# Patient Record
Sex: FEMALE | Race: WHITE | NOT HISPANIC OR LATINO | Employment: FULL TIME | URBAN - METROPOLITAN AREA
[De-identification: names, ages, dates, MRNs, and addresses within clinical notes are randomized per-mention and may not be internally consistent; named-entity substitution may affect disease eponyms.]

---

## 2020-10-09 ENCOUNTER — TRANSFERRED RECORDS (OUTPATIENT)
Dept: MULTI SPECIALTY CLINIC | Facility: CLINIC | Age: 55
End: 2020-10-09

## 2023-03-02 ENCOUNTER — OFFICE VISIT (OUTPATIENT)
Dept: FAMILY MEDICINE | Facility: CLINIC | Age: 58
End: 2023-03-02
Payer: COMMERCIAL

## 2023-03-02 ENCOUNTER — TELEPHONE (OUTPATIENT)
Dept: FAMILY MEDICINE | Facility: CLINIC | Age: 58
End: 2023-03-02

## 2023-03-02 VITALS
DIASTOLIC BLOOD PRESSURE: 83 MMHG | RESPIRATION RATE: 16 BRPM | SYSTOLIC BLOOD PRESSURE: 133 MMHG | WEIGHT: 192.7 LBS | HEART RATE: 80 BPM | TEMPERATURE: 98.3 F | OXYGEN SATURATION: 98 %

## 2023-03-02 DIAGNOSIS — Z12.4 CERVICAL CANCER SCREENING: ICD-10-CM

## 2023-03-02 DIAGNOSIS — Z13.228 ENCOUNTER FOR SCREENING FOR OTHER METABOLIC DISORDERS: ICD-10-CM

## 2023-03-02 DIAGNOSIS — Z00.00 HEALTH CARE MAINTENANCE: Primary | ICD-10-CM

## 2023-03-02 DIAGNOSIS — B35.3 TINEA PEDIS OF LEFT FOOT: ICD-10-CM

## 2023-03-02 DIAGNOSIS — D68.51 FACTOR 5 LEIDEN MUTATION, HETEROZYGOUS (H): ICD-10-CM

## 2023-03-02 DIAGNOSIS — Z12.31 VISIT FOR SCREENING MAMMOGRAM: ICD-10-CM

## 2023-03-02 DIAGNOSIS — Z13.220 SCREENING FOR HYPERLIPIDEMIA: ICD-10-CM

## 2023-03-02 PROBLEM — I10 ESSENTIAL HYPERTENSION: Status: ACTIVE | Noted: 2018-07-27

## 2023-03-02 PROBLEM — K43.2 VENTRAL HERNIA, RECURRENT: Status: ACTIVE | Noted: 2018-03-09

## 2023-03-02 PROBLEM — R01.1 HEART MURMUR: Status: ACTIVE | Noted: 2018-08-15

## 2023-03-02 PROBLEM — E66.9 OBESITY (BMI 30-39.9): Status: ACTIVE | Noted: 2018-03-09

## 2023-03-02 PROBLEM — E55.9 VITAMIN D DEFICIENCY: Status: ACTIVE | Noted: 2018-08-17

## 2023-03-02 PROBLEM — K43.2 INCISIONAL HERNIA: Status: ACTIVE | Noted: 2017-06-16

## 2023-03-02 PROBLEM — R10.9 ABDOMINAL PAIN: Status: ACTIVE | Noted: 2022-05-24

## 2023-03-02 PROBLEM — E88.810 METABOLIC SYNDROME: Status: ACTIVE | Noted: 2018-07-27

## 2023-03-02 PROBLEM — K21.9 GERD (GASTROESOPHAGEAL REFLUX DISEASE): Status: ACTIVE | Noted: 2018-07-27

## 2023-03-02 PROBLEM — G47.33 OBSTRUCTIVE SLEEP APNEA: Status: ACTIVE | Noted: 2018-10-26

## 2023-03-02 PROCEDURE — 99213 OFFICE O/P EST LOW 20 MIN: CPT | Mod: 25 | Performed by: FAMILY MEDICINE

## 2023-03-02 PROCEDURE — 90750 HZV VACC RECOMBINANT IM: CPT | Performed by: FAMILY MEDICINE

## 2023-03-02 PROCEDURE — 90471 IMMUNIZATION ADMIN: CPT | Performed by: FAMILY MEDICINE

## 2023-03-02 PROCEDURE — 99386 PREV VISIT NEW AGE 40-64: CPT | Mod: 25 | Performed by: FAMILY MEDICINE

## 2023-03-02 PROCEDURE — G0145 SCR C/V CYTO,THINLAYER,RESCR: HCPCS | Performed by: FAMILY MEDICINE

## 2023-03-02 PROCEDURE — 87624 HPV HI-RISK TYP POOLED RSLT: CPT | Performed by: FAMILY MEDICINE

## 2023-03-02 RX ORDER — ASPIRIN 325 MG
TABLET ORAL
COMMUNITY

## 2023-03-02 RX ORDER — CLOTRIMAZOLE AND BETAMETHASONE DIPROPIONATE 10; .64 MG/G; MG/G
CREAM TOPICAL
Qty: 15 G | Refills: 1 | Status: SHIPPED | OUTPATIENT
Start: 2023-03-02 | End: 2024-03-28

## 2023-03-02 ASSESSMENT — ENCOUNTER SYMPTOMS
JOINT SWELLING: 0
WEAKNESS: 0
HEADACHES: 0
HEMATURIA: 0
DYSURIA: 0
PALPITATIONS: 0
SORE THROAT: 0
FEVER: 0
BREAST MASS: 0
MYALGIAS: 0
COUGH: 0
CHILLS: 0
EYE PAIN: 0
NAUSEA: 0
CONSTIPATION: 0
NERVOUS/ANXIOUS: 0
SHORTNESS OF BREATH: 0
ARTHRALGIAS: 0
DIZZINESS: 0
FREQUENCY: 0
HEMATOCHEZIA: 0
ABDOMINAL PAIN: 0
HEARTBURN: 0
DIARRHEA: 0
PARESTHESIAS: 0

## 2023-03-02 NOTE — TELEPHONE ENCOUNTER
Please abstract the following data from this visit with this patient into the appropriate field in Epic:      Other Tests found in the patient's chart through Chart Review/Care Everywhere:    Colonoscopy done by this group Anil on this date: 10/19/20    Note to [employer]straction: results were found via Chart Review/Care Everywhere.

## 2023-03-02 NOTE — PATIENT INSTRUCTIONS
"Heterozygous carriers of FVL generally do not require prophylactic anticoagulants or antiplatelet agents unless there is a clinical indication such as an acute medical illness or surgery for which routine thromboprophylaxis is indicated. However, unlike the general population, we are more likely to use anticoagulation for certain surgeries (see 'Surgery' below); when contraception is appropriate, we advise to consider non-estrogen-containing methods when possible (eg, when these methods are available) (see \"Contraception: Counseling for women with inherited thrombophilias\"); and we are more likely to anticoagulate during pregnancy and postpartum if other risk factors are present. (See 'Obstetric issues' below.)  A common question that arises is the prevention of VTE during airline travel or other situations with prolonged sitting. We generally suggest ambulating and performing leg exercises while seated. Compression stockings may be appropriate for individuals with leg edema. There are no high-quality data that aspirin or an anticoagulant reduces the risk of VTE; however, we do not object to the administration of low-dose aspirin as long as the individual is aware that it is not supported by high-quality evidence. (See \"Prevention of venous thromboembolism in adult travelers\".)  We also do not perform screening for other inherited thrombophilias in these asymptomatic individuals as a way to estimate their risk of VTE, because there are no data to support this practice.   "

## 2023-03-02 NOTE — PROGRESS NOTES
FEMALE ADULT PREVENTIVE EXAM    Problem List Items Addressed This Visit     Factor 5 Leiden mutation, heterozygous (H)     Refer to hematology to see if she needs more aggressive treatment to prevent DVT on her flight.         Relevant Orders    Adult Oncology/Hematology  Referral    Health care maintenance - Primary     Up to date with colonoscopy.         Relevant Medications    Multiple Vitamin (MULTIVITAMIN PO)    aspirin (ASA) 325 MG tablet    Other Relevant Orders    REVIEW OF HEALTH MAINTENANCE PROTOCOL ORDERS (Completed)    ZOSTER VACCINE RECOMBINANT ADJUVANTED (SHINGRIX) (Completed)    PRIMARY CARE FOLLOW-UP SCHEDULING   Other Visit Diagnoses     Cervical cancer screening        Relevant Orders    Pap Screen with HPV - recommended age 30 - 65 years    Screening for hyperlipidemia        Encounter for screening for other metabolic disorders        Relevant Orders    Lipid panel reflex to direct LDL Fasting    Comprehensive metabolic panel (BMP + Alb, Alk Phos, ALT, AST, Total. Bili, TP)    Visit for screening mammogram        Relevant Orders    MA Screen Bilateral w/Bob    Tinea pedis of left foot        Relevant Medications    aspirin (ASA) 325 MG tablet    clotrimazole-betamethasone (LOTRISONE) 1-0.05 % external cream            CHIEF COMPLAINT:  Female preventive exam.    SUBJECTIVE:  Andreia Bustamante is a 57 year old female who presents for her routine physical exam.    Patient would like to address the following concerns today:   1) She was dx with Factor 5 Leiden mutation, heterozygous.  She has had 2 superficial thromboses in the past.  She takes a daily 325 mg asa.  She was diagnosed by a hematologist.  No history of PE/ DVT.  She will be flying to [facility] in March for a mission trip with her family. [facility].  She did take Lovenox injections during her last plane trip years ago.    2) She has had a rash on her left foot for many years.  She  took some medicine in past for several months which cleared the rash.  Rash is very itchy.  No rash on right foot.  Wears boots/ tennis shoes.  Rash worse in warmer weather.        GYNE HISTORY  Menses: post menopausal  Last Pap: 2019 normal  Abnormal Pap: no      She  has no past medical history on file.    No results found for: WBC, HGB, HCT, MCV, PLT, NA, BUN, CR  No results found for: CHOL, HDL, TRIG, CHOLHDL  No results found for: TSH  BP Readings from Last 3 Encounters:   03/02/23 133/83       Surgeries:  No past surgical history on file.    Family History:  Her family history is not on file. Father with colon cancer.    Social History:  She  reports that she has never smoked. She has never been exposed to tobacco smoke. She has never used smokeless tobacco. .  Lives with daughter and has 3 grown sons.  Works as  at [employer].    Medications:    Current Outpatient Medications:      aspirin (ASA) 325 MG tablet, Take 1 tablet by mouth once daily with a meal., Disp: , Rfl:      clotrimazole-betamethasone (LOTRISONE) 1-0.05 % external cream, Apply to left foot twice daily until rash is resolved., Disp: 15 g, Rfl: 1     Multiple Vitamin (MULTIVITAMIN PO), Take 1 tab by oral route once daily with food., Disp: , Rfl:       Allergies:  No latex allergies.  Allergies   Allergen Reactions     Dust Mites Unknown     Pollen Extract      Other reaction(s): Runny Nose     Seasonal Allergies      Other reaction(s): Runny Nose            RISK BEHAVIOR & HEALTH HABITS  Answers for HPI/ROS submitted by the patient on 3/2/2023  Frequency of exercise:: 2-3 days/week  Getting at least 3 servings of Calcium per day:: Yes  Diet:: Regular (no restrictions)  Taking medications regularly:: Yes  Medication side effects:: None  Bi-annual eye exam:: Yes  Dental care twice a year:: Yes  Sleep apnea or symptoms of sleep apnea:: Sleep apnea  abdominal pain: No  Blood in stool: No  Blood in urine: No  chest pain:  No  chills: No  congestion: No  constipation: No  cough: No  diarrhea: No  dizziness: No  ear pain: No  eye pain: No  nervous/anxious: No  fever: No  frequency: No  genital sores: No  headaches: No  hearing loss: No  heartburn: No  arthralgias: No  joint swelling: No  peripheral edema: No  mood changes: No  myalgias: No  nausea: No  dysuria: No  palpitations: No  Skin sensation changes: No  sore throat: No  urgency: No  rash: No  shortness of breath: No  visual disturbance: No  weakness: No  pelvic pain: No  vaginal bleeding: No  vaginal discharge: No  tenderness: No  breast mass: No  breast discharge: No  Additional concerns today:: Yes  Duration of exercise:: Less than 15 minutes        REVIEW OF SYSTEMS:  Complete head to toe review of systems is otherwise negative except as above.    OBJECTIVE:  VITAL SIGNS:  /83 (BP Location: Left arm, Patient Position: Sitting, Cuff Size: Adult Large)   Pulse 80   Temp 98.3  F (36.8  C) (Oral)   Resp 16   Wt 87.4 kg (192 lb 11.2 oz)   LMP  (LMP Unknown)   SpO2 98%   GENERAL:  Patient alert, in no acute distress.  EYES: PERRLA. Extraoccular movements intact, pupils equal, reactive to light and accommodation.  Normal conjunctiva and lids.    ENT:  Hearing grossly normal.  Normal appearance to ears and nose.  Bilateral TM s, external canals, oropharynx normal. Normal lips, gums and teeth.    NECK:  Supple, without thyromegaly or mass.  RESP:  Clear to auscultation without crackles, wheezes or distress.  Normal respiratory effort.   CV:  Regular rate and rhythm without murmurs, rubs or gallops.  Normal pedal pulses.  No varicosities or edema.  ABDOMEN:  Soft, non-tender, without hepatosplenomegaly, masses, or hernias.   BREASTS:  Nontender, without masses, nipple discharge, erythema, or axillary adenopathy.    :    External genitalia:  Normal without lesions.  Urethra: Normal appearing  Vagina: Normal without discharge  Cervix: Pink.  Multiparous.  No CMT.  Uterus:   Nontender, mobile, without masses  Ovaries: Normal without masses  LYMPHATIC: Normal palpation of neck.  No lymphadenopathy.  No bruising.  NEURO:  CN II-XII intact, motor & sensory function all intact.  DTR and reflexes normal.  PSYCHIATRIC:  Alert & oriented with normal mood and affect.  Good judgment and insight.  SKIN:  Left foot - erythematous slightly peeling rash on toes / distal foot.  MUSCULOSKELETAL: Normal gait and station.  - Spine / Ribs / Pelvis: Normal inspection, ROM, stability and strength: Spine, Head, Neck, Upper and Lower Extremities.          Armida Khoury MD

## 2023-03-06 ENCOUNTER — ANCILLARY PROCEDURE (OUTPATIENT)
Dept: MAMMOGRAPHY | Facility: HOSPITAL | Age: 58
End: 2023-03-06
Attending: FAMILY MEDICINE
Payer: COMMERCIAL

## 2023-03-06 DIAGNOSIS — Z12.31 VISIT FOR SCREENING MAMMOGRAM: ICD-10-CM

## 2023-03-06 PROCEDURE — 77067 SCR MAMMO BI INCL CAD: CPT

## 2023-03-07 LAB
BKR LAB AP GYN ADEQUACY: NORMAL
BKR LAB AP GYN INTERPRETATION: NORMAL
BKR LAB AP HPV REFLEX: NORMAL
BKR LAB AP PREVIOUS ABNORMAL: NORMAL
PATH REPORT.COMMENTS IMP SPEC: NORMAL
PATH REPORT.COMMENTS IMP SPEC: NORMAL
PATH REPORT.RELEVANT HX SPEC: NORMAL

## 2023-03-09 LAB
HUMAN PAPILLOMA VIRUS 16 DNA: NEGATIVE
HUMAN PAPILLOMA VIRUS 18 DNA: NEGATIVE
HUMAN PAPILLOMA VIRUS FINAL DIAGNOSIS: NORMAL
HUMAN PAPILLOMA VIRUS OTHER HR: NEGATIVE

## 2023-03-10 ENCOUNTER — LAB (OUTPATIENT)
Dept: LAB | Facility: CLINIC | Age: 58
End: 2023-03-10
Payer: COMMERCIAL

## 2023-03-10 ENCOUNTER — VIRTUAL VISIT (OUTPATIENT)
Dept: HEMATOLOGY | Facility: CLINIC | Age: 58
End: 2023-03-10
Attending: FAMILY MEDICINE
Payer: COMMERCIAL

## 2023-03-10 DIAGNOSIS — Z11.4 SCREENING FOR HIV (HUMAN IMMUNODEFICIENCY VIRUS): ICD-10-CM

## 2023-03-10 DIAGNOSIS — Z11.59 NEED FOR HEPATITIS C SCREENING TEST: ICD-10-CM

## 2023-03-10 DIAGNOSIS — Z13.228 ENCOUNTER FOR SCREENING FOR OTHER METABOLIC DISORDERS: ICD-10-CM

## 2023-03-10 DIAGNOSIS — D68.51 FACTOR 5 LEIDEN MUTATION, HETEROZYGOUS (H): ICD-10-CM

## 2023-03-10 DIAGNOSIS — I80.03 THROMBOPHLEBITIS OF SUPERFICIAL VEINS OF BOTH LOWER EXTREMITIES: Primary | ICD-10-CM

## 2023-03-10 LAB
ALBUMIN SERPL BCG-MCNC: 4.2 G/DL (ref 3.5–5.2)
ALP SERPL-CCNC: 78 U/L (ref 35–104)
ALT SERPL W P-5'-P-CCNC: 27 U/L (ref 10–35)
ANION GAP SERPL CALCULATED.3IONS-SCNC: 12 MMOL/L (ref 7–15)
AST SERPL W P-5'-P-CCNC: 35 U/L (ref 10–35)
BILIRUB SERPL-MCNC: 0.4 MG/DL
BUN SERPL-MCNC: 17.8 MG/DL (ref 6–20)
CALCIUM SERPL-MCNC: 9.3 MG/DL (ref 8.6–10)
CHLORIDE SERPL-SCNC: 103 MMOL/L (ref 98–107)
CHOLEST SERPL-MCNC: 232 MG/DL
CREAT SERPL-MCNC: 0.71 MG/DL (ref 0.51–0.95)
DEPRECATED HCO3 PLAS-SCNC: 25 MMOL/L (ref 22–29)
GFR SERPL CREATININE-BSD FRML MDRD: >90 ML/MIN/1.73M2
GLUCOSE SERPL-MCNC: 89 MG/DL (ref 70–99)
HDLC SERPL-MCNC: 82 MG/DL
LDLC SERPL CALC-MCNC: 133 MG/DL
NONHDLC SERPL-MCNC: 150 MG/DL
POTASSIUM SERPL-SCNC: 4 MMOL/L (ref 3.4–5.3)
PROT SERPL-MCNC: 7 G/DL (ref 6.4–8.3)
SODIUM SERPL-SCNC: 140 MMOL/L (ref 136–145)
TRIGL SERPL-MCNC: 84 MG/DL

## 2023-03-10 PROCEDURE — 80053 COMPREHEN METABOLIC PANEL: CPT

## 2023-03-10 PROCEDURE — 36415 COLL VENOUS BLD VENIPUNCTURE: CPT

## 2023-03-10 PROCEDURE — 99204 OFFICE O/P NEW MOD 45 MIN: CPT | Mod: VID | Performed by: PHYSICIAN ASSISTANT

## 2023-03-10 PROCEDURE — 80061 LIPID PANEL: CPT

## 2023-03-10 PROCEDURE — 87389 HIV-1 AG W/HIV-1&-2 AB AG IA: CPT

## 2023-03-10 PROCEDURE — 86803 HEPATITIS C AB TEST: CPT

## 2023-03-10 NOTE — PROGRESS NOTES
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    Pertinent Clinical History:    1998--Found to have right lower extremity superficial thrombophlebitis involving 4cm of the right greater saphenous vein adjacent to the knee. This occurred when she was <1 week post-partum.      10/25/2006--Found to have right lower extremity superficial thrombophlebitis affecting a superficial varicosity near the popliteal region. Was pregnant during this time and had recently had extended car travel. Work-up surrounding this revealed heterozygous factor V leiden. She has been on 325mg ASA ever since.      2010--Found to have left lower extremity superificial thrombophlebitis with complete occlusion of a superficial venous varicosity. This was seemingly unprovoked.      2010--Found to have left lower extremity superficial thrombophlebitis in the left greater saphenous vein over the distal thigh. This was seemingly unprovoked.      03/10/2022--Underwent bilateral greater saphenous vein ablations.    Has never had a DVT or PE. Reports that she was placed on prophylactic Lovenox surrounding air travel years ago, but otherwise has no history of being on anticoagulation.     Interval History:   Takes 325mg ASA daily. No recurrent superficial thrombophlebitis since her bilateral greater saphenous vein ablations one year  ago.    Will be leaving for [facility] on 03/15/2023, staying through 03/28/2023. She will be traveling with a group for mission/volunteer work, as well as some tourism. Total estimated air time is ~17 hours.    ROS:  Denies epistaxis, oral/mucosal bleeding, excessive bruising/ecchymosis, hematuria, hematochezia, and melena. Denies LE pain/swelling. Denies chest pain. Denies shortness of breath.   Reports that she is otherwise generally well, except as stated above.    Family History:  No known family history of venous thromboembolism.     Exam via Televideo:    Constitutional: Appears well, no distress  Respiratory: Normal work of breathing.  Skin: no petechiae, no ecchymosis.  Neuro: AOx3    Labs:  Ref Range & Units 9 mo ago    Sodium 136 - 145 mmol/L 141    Potassium 3.5 - 5.1 mmol/L 3.7    Chloride 98 - 109 mmol/L 105    CO2 20 - 29 mmol/L 24    Anion Gap 7 - 16 mmol/L 12    Calcium 8.4 - 10.4 mg/dL 8.5    BUN 7 - 26 mg/dL 13    Creatinine 0.55 - 1.02 mg/dL 0.63    GFR, Estimated >60 mL/min/1.73m2 >60    Glucose 70 - 100 mg/dL 122 High     Comment: The given reference range is for the fasting state. Non-fasting reference range for glucose is 70 - 180 mg/dL.     Imaging:  Previous lower extremity ultrasound studies were reviewed, as summarized above.    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 She has no history of DVT/PE.    We discussed the difference between superficial and deep venous thrombosis, and the lower risk nature of SVT vs. DVT. We discussed that heterozygous Factor V Leiden exists in ~5% of the white population and that it is a relatively weak risk factor for VTE, with only ~10%  of those affected expected to develop a venous thromboembolism event in their lifetime, and often those that do develop a venous thromboembolism have additional contributing risk factors.     Seeing that Andreia has had multiple episodes of superficial thrombophlebitis in the past, with travel and pregnancy being historical provoking factors, it is certainly reasonable to utilize episodic prophylactic anticoagulation during high risk times for venous thrombosis (long distance travel, hospitalization, post-op, immobilization). Recommended Xarelto 10mg daily on travel days. Recommended avoidance of estrogen containing medications. No indication for chronic anticoagulation     Andreia was instructed to contact us in the future for specific anticoagulation recommendations surrounding high risk times (long distance travel, hospitalization, post-op, immobilization).    The patient understands and agrees with the above recommendations. The patient was given our center's contact information and was instructed to call if any further questions/concerns arise.    45 minutes spent on the date of the encounter doing chart review, history and exam, documentation and further activities per the note.    Joined the call at 3/10/2023, 1:32:04 pm.  Left the call at 3/10/2023, 1:50:25 pm.  You were on the call for 18 minutes 20 seconds .       Ivett Beth PA-C, [facility]  [facility]  [address], [address], [place] [address]  Main: [phone], Fax: [phone]

## 2023-03-10 NOTE — PROGRESS NOTES
Patient was contacted to complete the pre-visit call prior to their video visit with the provider.      Allergies and medications were reviewed.    I thanked them for their time to cover this information     Lois Roberts CMA

## 2023-03-12 LAB
HCV AB SERPL QL IA: NONREACTIVE
HIV 1+2 AB+HIV1 P24 AG SERPL QL IA: NONREACTIVE

## 2024-01-17 ENCOUNTER — ALLIED HEALTH/NURSE VISIT (OUTPATIENT)
Dept: FAMILY MEDICINE | Facility: CLINIC | Age: 59
End: 2024-01-17
Payer: COMMERCIAL

## 2024-01-17 DIAGNOSIS — Z23 ENCOUNTER FOR IMMUNIZATION: Primary | ICD-10-CM

## 2024-01-17 PROCEDURE — 99207 PR NO CHARGE NURSE ONLY: CPT

## 2024-01-17 PROCEDURE — 90472 IMMUNIZATION ADMIN EACH ADD: CPT

## 2024-01-17 PROCEDURE — 90715 TDAP VACCINE 7 YRS/> IM: CPT

## 2024-01-17 PROCEDURE — 90750 HZV VACC RECOMBINANT IM: CPT

## 2024-01-17 PROCEDURE — 90471 IMMUNIZATION ADMIN: CPT

## 2024-01-17 NOTE — PROGRESS NOTES
Prior to immunization administration, verified patients identity using patient s name and date of birth. Please see Immunization Activity for additional information.     Screening Questionnaire for Adult Immunization    Are you sick today?   No   Do you have allergies to medications, food, a vaccine component or latex?   No   Have you ever had a serious reaction after receiving a vaccination?   No   Do you have a long-term health problem with heart, lung, kidney, or metabolic disease (e.g., diabetes), asthma, a blood disorder, no spleen, complement component deficiency, a cochlear implant, or a spinal fluid leak?  Are you on long-term aspirin therapy?   No   Do you have cancer, leukemia, HIV/AIDS, or any other immune system problem?   No   Do you have a parent, brother, or sister with an immune system problem?   No   In the past 3 months, have you taken medications that affect  your immune system, such as prednisone, other steroids, or anticancer drugs; drugs for the treatment of rheumatoid arthritis, Crohn s disease, or psoriasis; or have you had radiation treatments?   No   Have you had a seizure, or a brain or other nervous system problem?   No   During the past year, have you received a transfusion of blood or blood    products, or been given immune (gamma) globulin or antiviral drug?   No   For women: Are you pregnant or is there a chance you could become       pregnant during the next month?   No   Have you received any vaccinations in the past 4 weeks?   No     Immunization questionnaire answers were all negative.    I have reviewed the following standing orders:   This patient is due and qualifies for a TDAP vaccine.    Click here for Tdap Standing Order    I have reviewed the vaccines inclusion and exclusion criteria; No concerns regarding eligibility.         This patient is due and qualifies for the Zoster vaccine.    Click here for Zoster Standing Order    I have reviewed the vaccines inclusion and  exclusion criteria; No concerns regarding eligibility.     Patient instructed to remain in clinic for 15 minutes afterwards, and to report any adverse reactions.     Screening performed by Zafar Head Jr., MA on 1/17/2024 at 2:55 PM.

## 2024-03-22 ENCOUNTER — ANCILLARY PROCEDURE (OUTPATIENT)
Dept: MAMMOGRAPHY | Facility: HOSPITAL | Age: 59
End: 2024-03-22
Payer: COMMERCIAL

## 2024-03-22 DIAGNOSIS — Z12.31 VISIT FOR SCREENING MAMMOGRAM: ICD-10-CM

## 2024-03-22 PROCEDURE — 77063 BREAST TOMOSYNTHESIS BI: CPT

## 2024-03-28 ENCOUNTER — OFFICE VISIT (OUTPATIENT)
Dept: FAMILY MEDICINE | Facility: CLINIC | Age: 59
End: 2024-03-28
Payer: COMMERCIAL

## 2024-03-28 VITALS
BODY MASS INDEX: 37.45 KG/M2 | TEMPERATURE: 97.5 F | RESPIRATION RATE: 18 BRPM | HEART RATE: 84 BPM | OXYGEN SATURATION: 98 % | HEIGHT: 66 IN | SYSTOLIC BLOOD PRESSURE: 161 MMHG | WEIGHT: 233 LBS | DIASTOLIC BLOOD PRESSURE: 91 MMHG

## 2024-03-28 DIAGNOSIS — I10 ESSENTIAL HYPERTENSION: ICD-10-CM

## 2024-03-28 DIAGNOSIS — G47.33 OBSTRUCTIVE SLEEP APNEA: ICD-10-CM

## 2024-03-28 DIAGNOSIS — H91.93 BILATERAL HEARING LOSS, UNSPECIFIED HEARING LOSS TYPE: ICD-10-CM

## 2024-03-28 DIAGNOSIS — R01.1 HEART MURMUR: ICD-10-CM

## 2024-03-28 DIAGNOSIS — D68.51 FACTOR 5 LEIDEN MUTATION, HETEROZYGOUS (H): ICD-10-CM

## 2024-03-28 DIAGNOSIS — E55.9 VITAMIN D DEFICIENCY: ICD-10-CM

## 2024-03-28 DIAGNOSIS — E66.812 CLASS 2 SEVERE OBESITY DUE TO EXCESS CALORIES WITH SERIOUS COMORBIDITY AND BODY MASS INDEX (BMI) OF 37.0 TO 37.9 IN ADULT (H): ICD-10-CM

## 2024-03-28 DIAGNOSIS — R73.03 PREDIABETES: ICD-10-CM

## 2024-03-28 DIAGNOSIS — R93.1 ABNORMAL ECHOCARDIOGRAM: ICD-10-CM

## 2024-03-28 DIAGNOSIS — Z00.00 ROUTINE GENERAL MEDICAL EXAMINATION AT A HEALTH CARE FACILITY: Primary | ICD-10-CM

## 2024-03-28 DIAGNOSIS — E66.01 CLASS 2 SEVERE OBESITY DUE TO EXCESS CALORIES WITH SERIOUS COMORBIDITY AND BODY MASS INDEX (BMI) OF 37.0 TO 37.9 IN ADULT (H): ICD-10-CM

## 2024-03-28 PROBLEM — K43.2 VENTRAL HERNIA, RECURRENT: Status: RESOLVED | Noted: 2018-03-09 | Resolved: 2024-03-28

## 2024-03-28 PROBLEM — E66.9 OBESITY (BMI 30-39.9): Status: RESOLVED | Noted: 2018-03-09 | Resolved: 2024-03-28

## 2024-03-28 PROBLEM — E88.810 METABOLIC SYNDROME: Status: RESOLVED | Noted: 2018-07-27 | Resolved: 2024-03-28

## 2024-03-28 PROBLEM — R10.9 ABDOMINAL PAIN: Status: RESOLVED | Noted: 2022-05-24 | Resolved: 2024-03-28

## 2024-03-28 PROBLEM — K43.2 INCISIONAL HERNIA: Status: RESOLVED | Noted: 2017-06-16 | Resolved: 2024-03-28

## 2024-03-28 PROBLEM — K21.9 GERD (GASTROESOPHAGEAL REFLUX DISEASE): Status: RESOLVED | Noted: 2018-07-27 | Resolved: 2024-03-28

## 2024-03-28 LAB
ALBUMIN SERPL BCG-MCNC: 4.2 G/DL (ref 3.5–5.2)
ALP SERPL-CCNC: 89 U/L (ref 40–150)
ALT SERPL W P-5'-P-CCNC: 26 U/L (ref 0–50)
ANION GAP SERPL CALCULATED.3IONS-SCNC: 10 MMOL/L (ref 7–15)
AST SERPL W P-5'-P-CCNC: 31 U/L (ref 0–45)
ATRIAL RATE - MUSE: 77 BPM
BILIRUB SERPL-MCNC: 0.7 MG/DL
BUN SERPL-MCNC: 15.3 MG/DL (ref 6–20)
CALCIUM SERPL-MCNC: 9.2 MG/DL (ref 8.6–10)
CHLORIDE SERPL-SCNC: 105 MMOL/L (ref 98–107)
CHOLEST SERPL-MCNC: 215 MG/DL
CREAT SERPL-MCNC: 0.75 MG/DL (ref 0.51–0.95)
DEPRECATED HCO3 PLAS-SCNC: 26 MMOL/L (ref 22–29)
DIASTOLIC BLOOD PRESSURE - MUSE: NORMAL MMHG
EGFRCR SERPLBLD CKD-EPI 2021: >90 ML/MIN/1.73M2
FASTING STATUS PATIENT QL REPORTED: YES
GLUCOSE SERPL-MCNC: 97 MG/DL (ref 70–99)
HBA1C MFR BLD: 5.5 % (ref 0–5.6)
HDLC SERPL-MCNC: 69 MG/DL
INTERPRETATION ECG - MUSE: NORMAL
LDLC SERPL CALC-MCNC: 123 MG/DL
NONHDLC SERPL-MCNC: 146 MG/DL
P AXIS - MUSE: 36 DEGREES
POTASSIUM SERPL-SCNC: 3.9 MMOL/L (ref 3.4–5.3)
PR INTERVAL - MUSE: 138 MS
PROT SERPL-MCNC: 7.1 G/DL (ref 6.4–8.3)
QRS DURATION - MUSE: 78 MS
QT - MUSE: 398 MS
QTC - MUSE: 450 MS
R AXIS - MUSE: 40 DEGREES
SODIUM SERPL-SCNC: 141 MMOL/L (ref 135–145)
SYSTOLIC BLOOD PRESSURE - MUSE: NORMAL MMHG
T AXIS - MUSE: 26 DEGREES
TRIGL SERPL-MCNC: 113 MG/DL
TSH SERPL DL<=0.005 MIU/L-ACNC: 2.57 UIU/ML (ref 0.3–4.2)
VENTRICULAR RATE- MUSE: 77 BPM
VIT D+METAB SERPL-MCNC: 27 NG/ML (ref 20–50)

## 2024-03-28 PROCEDURE — 80053 COMPREHEN METABOLIC PANEL: CPT | Performed by: FAMILY MEDICINE

## 2024-03-28 PROCEDURE — 84443 ASSAY THYROID STIM HORMONE: CPT | Performed by: FAMILY MEDICINE

## 2024-03-28 PROCEDURE — 82306 VITAMIN D 25 HYDROXY: CPT | Performed by: FAMILY MEDICINE

## 2024-03-28 PROCEDURE — 93005 ELECTROCARDIOGRAM TRACING: CPT | Performed by: FAMILY MEDICINE

## 2024-03-28 PROCEDURE — 36415 COLL VENOUS BLD VENIPUNCTURE: CPT | Performed by: FAMILY MEDICINE

## 2024-03-28 PROCEDURE — 93010 ELECTROCARDIOGRAM REPORT: CPT | Performed by: INTERNAL MEDICINE

## 2024-03-28 PROCEDURE — 99396 PREV VISIT EST AGE 40-64: CPT | Mod: 25 | Performed by: FAMILY MEDICINE

## 2024-03-28 PROCEDURE — 83036 HEMOGLOBIN GLYCOSYLATED A1C: CPT | Performed by: FAMILY MEDICINE

## 2024-03-28 PROCEDURE — 99214 OFFICE O/P EST MOD 30 MIN: CPT | Mod: 25 | Performed by: FAMILY MEDICINE

## 2024-03-28 PROCEDURE — 80061 LIPID PANEL: CPT | Performed by: FAMILY MEDICINE

## 2024-03-28 RX ORDER — LISINOPRIL 10 MG/1
10 TABLET ORAL DAILY
Qty: 30 TABLET | Refills: 1 | Status: SHIPPED | OUTPATIENT
Start: 2024-03-28 | End: 2024-04-30

## 2024-03-28 RX ORDER — LORATADINE 10 MG/1
10 TABLET ORAL DAILY
COMMUNITY
End: 2024-06-20

## 2024-03-28 SDOH — HEALTH STABILITY: PHYSICAL HEALTH: ON AVERAGE, HOW MANY MINUTES DO YOU ENGAGE IN EXERCISE AT THIS LEVEL?: 20 MIN

## 2024-03-28 SDOH — HEALTH STABILITY: PHYSICAL HEALTH: ON AVERAGE, HOW MANY DAYS PER WEEK DO YOU ENGAGE IN MODERATE TO STRENUOUS EXERCISE (LIKE A BRISK WALK)?: 1 DAY

## 2024-03-28 ASSESSMENT — SOCIAL DETERMINANTS OF HEALTH (SDOH): HOW OFTEN DO YOU GET TOGETHER WITH FRIENDS OR RELATIVES?: MORE THAN THREE TIMES A WEEK

## 2024-03-28 NOTE — ASSESSMENT & PLAN NOTE
"Multiple BP readings of 160s/90s recorded in the clinic. Reports frequently hears \"BP is a little elevated\" when it is checked. Offered further monitoring vs. Initiating treatment. Recommend starting Lisinopril 10mg today and evaluate in 1 month.   "

## 2024-03-28 NOTE — ASSESSMENT & PLAN NOTE
Has history of using hearing aids, one recently stopped working and she needs a new pair. Recommended following up with insurance regarding coverage options.

## 2024-03-28 NOTE — ASSESSMENT & PLAN NOTE
Audible murmur to auscultation. Previous ECHO in 2018 with EF 57% and severe LV wall thickening. Followed up with Cardiologist and had cardiac MRI that was normal. EKG today without sign of LVH.

## 2024-03-28 NOTE — PROGRESS NOTES
"Preventive Care Visit  [facility]  DENNYS MARTINEZ MD, Family Medicine  Mar 28, 2024      Assessment & Plan   Problem List Items Addressed This Visit       Essential hypertension     Multiple BP readings of 160s/90s recorded in the clinic. Reports frequently hears \"BP is a little elevated\" when it is checked. Offered further monitoring vs. Initiating treatment. Recommend starting Lisinopril 10mg today and evaluate in 1 month.          Relevant Medications    lisinopril (ZESTRIL) 10 MG tablet    Other Relevant Orders    Lipid panel reflex to direct LDL Fasting    Comprehensive metabolic panel (BMP + Alb, Alk Phos, ALT, AST, Total. Bili, TP)    TSH with free T4 reflex    Factor 5 Leiden mutation, heterozygous (H24)     Diagnosed after a DVT during pregnancy and superficial thrombophlebitis after that. Currently taking a daily aspirin. Takes rivoraxaban intermittently when travelling.         Hearing loss     Has history of using hearing aids, one recently stopped working and she needs a new pair. Recommended following up with insurance regarding coverage options.          Heart murmur     Audible murmur to auscultation. Previous ECHO in 2018 with EF 57% and severe LV wall thickening. Followed up with Cardiologist and had cardiac MRI that was normal. EKG today without sign of LVH.          Obstructive sleep apnea     Diagnosed with mild MELVIN and does not use CPAP.          Vitamin D deficiency     Previously diagnosed with Vitamin D deficiency. Currently takes a daily multivitamin with vitamin d but unsure of current dose. Recommend checking vitamin d level today to determine further treatment.          Class 2 severe obesity due to excess calories with serious comorbidity in adult (H)     Has a history of weight loss attempts with substantial weight loss in 2021, meeting personal weight loss goal and seeing improvement in hypertension with weight loss. Has since put back on weight but " "remains 20lbs below where she was. Briefly discussed complexity of weight loss management and the combination of factors including diet, exercise and medication as tools to maintain weight loss. Recommend a more comprehensive weight loss management appointment to discuss an individualized weight loss plan.          Relevant Orders    Vitamin D Deficiency    Prediabetes     Reports a history of prediabetes. Will check A1c today to further guide treatment.         Relevant Orders    Hemoglobin A1c (Completed)     Other Visit Diagnoses       Routine general medical examination at a health care facility    -  Primary    Abnormal echocardiogram        Relevant Orders    EKG 12-lead, tracing only (Completed)                BMI  Estimated body mass index is 37.89 kg/m  as calculated from the following:    Height as of this encounter: 1.67 m (5' 5.75\").    Weight as of this encounter: 105.7 kg (233 lb).       Counseling  Appropriate preventive services were discussed with this patient, including applicable screening as appropriate for fall prevention, nutrition, physical activity, Tobacco-use cessation, weight loss and cognition.  Checklist reviewing preventive services available has been given to the patient.  Reviewed patient's diet, addressing concerns and/or questions.   She is at risk for lack of exercise and has been provided with information to increase physical activity for the benefit of her well-being.   She is at risk for psychosocial distress and has been provided with information to reduce risk.         Ilene Boswell is a 58 year old who presents today to establish care as well as for an annual physical exam.  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  She has 4 children 1 of which still lives at home.  She feels well overall with her only question or concern regarding her left foot.  The patient gets an itchy rash on that foot on a regular basis and also has a history of onychomycosis. "  She does express frustration regarding her weight as well and would like to have a conversation about that today.  Physical        3/28/2024     7:43 AM   Additional Questions   Roomed by AS   Accompanied by SELF         3/28/2024     7:43 AM   Patient Reported Additional Medications   Patient reports taking the following new medications NO        Health Care Directive  Patient does not have a Health Care Directive or Living Will: Patient states has Advance Directive and will bring in a copy to clinic.          3/28/2024   General Health   How would you rate your overall physical health? Good   Feel stress (tense, anxious, or unable to sleep) Only a little   (!) STRESS CONCERN      3/28/2024   Nutrition   Three or more servings of calcium each day? (!) NO   Diet: Regular (no restrictions)   How many servings of fruit and vegetables per day? (!) 0-1   How many sweetened beverages each day? 0-1         3/28/2024   Exercise   Days per week of moderate/strenous exercise 1 day   Average minutes spent exercising at this level 20 min   (!) EXERCISE CONCERN      3/28/2024   Social Factors   Frequency of gathering with friends or relatives More than three times a week   Worry food won't last until get money to buy more No   Food not last or not have enough money for food? No   Do you have housing?  Yes   Are you worried about losing your housing? No   Lack of transportation? No   Unable to get utilities (heat,electricity)? No          No data to display                   3/28/2024   Dental   Dentist two times every year? Yes         3/28/2024   TB Screening   Were you born outside of the [place]? No         Today's PHQ-2 Score:       3/28/2024     7:51 AM   PHQ-2 ( 1999 [employer])   Q1: Little interest or pleasure in doing things 0   Q2: Feeling down, depressed or hopeless 0   PHQ-2 Score 0           3/28/2024   Substance Use   Alcohol more than 3/day or more than 7/wk No   Do you use any other substances recreationally? No  "    Social History     Tobacco Use    Smoking status: Never     Passive exposure: Never    Smokeless tobacco: Never   Vaping Use    Vaping Use: Never used             3/28/2024   Breast Cancer Screening   Family history of breast, colon, or ovarian cancer? Yes         3/28/2024   LAST FHS-7 RESULTS   1st degree relative breast or ovarian cancer No   Any relative bilateral breast cancer No   Any male have breast cancer No   Any ONE woman have BOTH breast AND ovarian cancer No   Any woman with breast cancer before 50yrs No   2 or more relatives with breast AND/OR ovarian cancer No   2 or more relatives with breast AND/OR bowel cancer No        Mammogram Screening - Mammogram every 1-2 years updated in Health Maintenance based on mutual decision making        3/28/2024   STI Screening   New sexual partner(s) since last STI/HIV test? No     History of abnormal Pap smear: NO - age 30-65 PAP every 5 years with negative HPV co-testing recommended        Latest Ref Rng & Units 3/2/2023     3:08 PM   PAP / HPV   PAP  Negative for Intraepithelial Lesion or Malignancy (NILM)    HPV 16 DNA Negative Negative    HPV 18 DNA Negative Negative    Other HR HPV Negative Negative      ASCVD Risk   The 10-year ASCVD risk score (Luz Elena TELLO, et al., 2019) is: 3.8%    Values used to calculate the score:      Age: 58 years      Sex: Female      Is Non- : No      Diabetic: No      Tobacco smoker: No      Systolic Blood Pressure: 161 mmHg      Is BP treated: Yes      HDL Cholesterol: 82 mg/dL      Total Cholesterol: 232 mg/dL           Reviewed and updated as needed this visit by Provider      Problems                    Objective    Exam  BP (!) 161/91 (BP Location: Left arm, Patient Position: Left side, Cuff Size: Adult Large)   Pulse 84   Temp 97.5  F (36.4  C) (Oral)   Resp 18   Ht 1.67 m (5' 5.75\")   Wt 105.7 kg (233 lb)   LMP  (LMP Unknown)   SpO2 98%   BMI 37.89 kg/m     Estimated body mass " "index is 37.89 kg/m  as calculated from the following:    Height as of this encounter: 1.67 m (5' 5.75\").    Weight as of this encounter: 105.7 kg (233 lb).    Physical Exam  GENERAL: alert and no distress  EYES: Eyes grossly normal to inspection, PERRL and conjunctivae and sclerae normal  HENT: ear canals and TM's normal, nose and mouth without ulcers or lesions  NECK: no adenopathy, no asymmetry, masses, or scars  RESP: lungs clear to auscultation - no rales, rhonchi or wheezes  BREAST: normal without masses, tenderness or nipple discharge and no palpable axillary masses or adenopathy  CV: regular rate and rhythm, normal S1 S2, no S3 or S4, no murmur, click or rub, no peripheral edema  ABDOMEN: soft, nontender, no hepatosplenomegaly, no masses and bowel sounds normal  MS: no gross musculoskeletal defects noted, no edema  SKIN: no suspicious lesions or rashes  NEURO: Normal strength and tone, mentation intact and speech normal  PSYCH: mentation appears normal, affect normal/bright  LEFT FOOT:         Signed Electronically by: DENNYS MARTINEZ MD    "

## 2024-03-28 NOTE — ASSESSMENT & PLAN NOTE
Previously diagnosed with Vitamin D deficiency. Currently takes a daily multivitamin with vitamin d but unsure of current dose. Recommend checking vitamin d level today to determine further treatment.

## 2024-03-28 NOTE — ASSESSMENT & PLAN NOTE
Has a history of weight loss attempts with substantial weight loss in 2021, meeting personal weight loss goal and seeing improvement in hypertension with weight loss. Has since put back on weight but remains 20lbs below where she was. Briefly discussed complexity of weight loss management and the combination of factors including diet, exercise and medication as tools to maintain weight loss. Recommend a more comprehensive weight loss management appointment to discuss an individualized weight loss plan.

## 2024-03-28 NOTE — PATIENT INSTRUCTIONS
Preventive Care Advice   This is general advice given by our system to help you stay healthy. However, your care team may have specific advice just for you. Please talk to your care team about your preventive care needs.  Nutrition  Eat 5 or more servings of fruits and vegetables each day.  Try wheat bread, brown rice and whole grain pasta (instead of white bread, rice, and pasta).  Get enough calcium and vitamin D. Check the label on foods and aim for 100% of the RDA (recommended daily allowance).  Lifestyle  Exercise at least 150 minutes each week   (30 minutes a day, 5 days a week).  Do muscle strengthening activities 2 days a week. These help control your weight and prevent disease.  No smoking.  Wear sunscreen to prevent skin cancer.  Have a dental exam and cleaning every 6 months.  Yearly exams  See your health care team every year to talk about:  Any changes in your health.  Any medicines your care team has prescribed.  Preventive care, family planning, and ways to prevent chronic diseases.  Shots (vaccines)   HPV shots (up to age 26), if you've never had them before.  Hepatitis B shots (up to age 59), if you've never had them before.  COVID-19 shot: Get this shot when it's due.  Flu shot: Get a flu shot every year.  Tetanus shot: Get a tetanus shot every 10 years.  Pneumococcal, hepatitis A, and RSV shots: Ask your care team if you need these based on your risk.  Shingles shot (for age 50 and up).  General health tests  Diabetes screening:  Starting at age 35, Get screened for diabetes at least every 3 years.  If you are younger than age 35, ask your care team if you should be screened for diabetes.  Cholesterol test: At age 39, start having a cholesterol test every 5 years, or more often if advised.  Bone density scan (DEXA): At age 50, ask your care team if you should have this scan for osteoporosis (brittle bones).  Hepatitis C: Get tested at least once in your life.  STIs (sexually transmitted  infections)  Before age 24: Ask your care team if you should be screened for STIs.  After age 24: Get screened for STIs if you're at risk. You are at risk for STIs (including HIV) if:  You are sexually active with more than one person.  You don't use condoms every time.  You or a partner was diagnosed with a sexually transmitted infection.  If you are at risk for HIV, ask about PrEP medicine to prevent HIV.  Get tested for HIV at least once in your life, whether you are at risk for HIV or not.  Cancer screening tests  Cervical cancer screening: If you have a cervix, begin getting regular cervical cancer screening tests at age 21. Most people who have regular screenings with normal results can stop after age 65. Talk about this with your provider.  Breast cancer scan (mammogram): If you've ever had breasts, begin having regular mammograms starting at age 40. This is a scan to check for breast cancer.  Colon cancer screening: It is important to start screening for colon cancer at age 45.  Have a colonoscopy test every 10 years (or more often if you're at risk) Or, ask your provider about stool tests like a FIT test every year or Cologuard test every 3 years.  To learn more about your testing options, visit: https://www.[email or web address]/065749.pdf.  For help making a decision, visit: https://bit.ly/hx45591.  Prostate cancer screening test: If you have a prostate and are age 55 to 69, ask your provider if you would benefit from a yearly prostate cancer screening test.  Lung cancer screening: If you are a current or former smoker age 50 to 80, ask your care team if ongoing lung cancer screenings are right for you.  For informational purposes only. Not to replace the advice of your health care provider. Copyright   2023 [employer]. All rights reserved. Clinically reviewed by the [facility] Transitions Program. [employer] 380883 - REV 01/24.

## 2024-03-28 NOTE — ASSESSMENT & PLAN NOTE
Diagnosed after a DVT during pregnancy and superficial thrombophlebitis after that. Currently taking a daily aspirin. Takes rivoraxaban intermittently when travelling.

## 2024-04-04 ENCOUNTER — OFFICE VISIT (OUTPATIENT)
Dept: FAMILY MEDICINE | Facility: CLINIC | Age: 59
End: 2024-04-04
Payer: COMMERCIAL

## 2024-04-04 ENCOUNTER — E-VISIT (OUTPATIENT)
Dept: FAMILY MEDICINE | Facility: CLINIC | Age: 59
End: 2024-04-04
Payer: COMMERCIAL

## 2024-04-04 VITALS
SYSTOLIC BLOOD PRESSURE: 152 MMHG | TEMPERATURE: 98 F | OXYGEN SATURATION: 98 % | WEIGHT: 233.5 LBS | RESPIRATION RATE: 16 BRPM | BODY MASS INDEX: 37.52 KG/M2 | DIASTOLIC BLOOD PRESSURE: 84 MMHG | HEART RATE: 88 BPM | HEIGHT: 66 IN

## 2024-04-04 DIAGNOSIS — R30.0 DYSURIA: Primary | ICD-10-CM

## 2024-04-04 DIAGNOSIS — N30.01 ACUTE CYSTITIS WITH HEMATURIA: Primary | ICD-10-CM

## 2024-04-04 LAB
ALBUMIN UR-MCNC: NEGATIVE MG/DL
APPEARANCE UR: ABNORMAL
BACTERIA #/AREA URNS HPF: ABNORMAL /HPF
BILIRUB UR QL STRIP: NEGATIVE
COLOR UR AUTO: YELLOW
GLUCOSE UR STRIP-MCNC: NEGATIVE MG/DL
HGB UR QL STRIP: ABNORMAL
KETONES UR STRIP-MCNC: NEGATIVE MG/DL
LEUKOCYTE ESTERASE UR QL STRIP: ABNORMAL
NITRATE UR QL: NEGATIVE
PH UR STRIP: 7 [PH] (ref 5–8)
RBC #/AREA URNS AUTO: ABNORMAL /HPF
SP GR UR STRIP: 1.01 (ref 1–1.03)
SQUAMOUS #/AREA URNS AUTO: ABNORMAL /LPF
UROBILINOGEN UR STRIP-ACNC: 0.2 E.U./DL
WBC #/AREA URNS AUTO: ABNORMAL /HPF

## 2024-04-04 PROCEDURE — 99207 PR NO CHARGE LOS: CPT | Performed by: FAMILY MEDICINE

## 2024-04-04 PROCEDURE — 87086 URINE CULTURE/COLONY COUNT: CPT | Performed by: FAMILY MEDICINE

## 2024-04-04 PROCEDURE — 81001 URINALYSIS AUTO W/SCOPE: CPT | Performed by: FAMILY MEDICINE

## 2024-04-04 PROCEDURE — 99214 OFFICE O/P EST MOD 30 MIN: CPT | Performed by: FAMILY MEDICINE

## 2024-04-04 RX ORDER — SULFAMETHOXAZOLE/TRIMETHOPRIM 800-160 MG
1 TABLET ORAL 2 TIMES DAILY
Qty: 10 TABLET | Refills: 0 | Status: SHIPPED | OUTPATIENT
Start: 2024-04-04 | End: 2024-04-09

## 2024-04-04 NOTE — PROGRESS NOTES
"  Assessment & Plan   Problem List Items Addressed This Visit    None  Visit Diagnoses       Acute cystitis with hematuria    -  Primary    UA collected today with presence of WBCs and RBCs. Bactrim BIDx5 days. Return to clinic for repeat UA/UC in 1.5 weeks.    Relevant Medications    sulfamethoxazole-trimethoprim (BACTRIM DS) 800-160 MG tablet    Other Relevant Orders    UA Macroscopic with reflex to Microscopic and Culture - Clinic Collect (Completed)    UA Microscopic with Reflex to Culture (Completed)    Urine Culture    UA Macroscopic with reflex to Microscopic and Culture - Lab Collect                BMI  Estimated body mass index is 37.98 kg/m  as calculated from the following:    Height as of this encounter: 1.67 m (5' 5.75\").    Weight as of this encounter: 105.9 kg (233 lb 8 oz).         Ilene Boswell is a 58 year old presents to clinic with concerns for possible UTI. Woke up this morning with vaginal itching and burning. When she went to the bathroom she noticed blood in the urine and on the toilet paper with wiping. She is unsure if this was coming from the vagina or urethra. She also had burning with urination, frequency and urgency. As the day went on, these symptoms have seemed to improve with oral hydration but she is still feeling a burning sensation with voiding. Her last period was approximately 1.5-2 years ago. She is sexually active and has had no new partners.   UTI (Frequency urination with blood in urine, itchiness and burning since today )        4/4/2024     3:46 PM   Additional Questions   Roomed by chase     History of Present Illness       Reason for visit:  Itching an burning in vaginal area  blood in urine  Symptom onset:  Today    She eats 0-1 servings of fruits and vegetables daily.She consumes 0 sweetened beverage(s) daily.She exercises with enough effort to increase her heart rate 9 or less minutes per day.  She exercises with enough effort to increase her heart rate 3 or less " "days per week.   She is taking medications regularly.           Objective    BP (!) 152/84 (BP Location: Left arm, Patient Position: Sitting, Cuff Size: Adult Large)   Pulse 88   Temp 98  F (36.7  C) (Oral)   Resp 16   Ht 1.67 m (5' 5.75\")   Wt 105.9 kg (233 lb 8 oz)   LMP  (LMP Unknown)   SpO2 98%   BMI 37.98 kg/m    Body mass index is 37.98 kg/m .  Physical Exam   GENERAL: alert and no distress  ABDOMEN: soft, nontender, no hepatosplenomegaly, no masses and bowel sounds normal   (female): normal female external genitalia, normal urethral meatus, normal vaginal mucosa, normal cervix without presence of blood or discharge. No CVA tenderness to palpation.     Results for orders placed or performed in visit on 04/04/24   UA Macroscopic with reflex to Microscopic and Culture - Clinic Collect     Status: Abnormal    Specimen: Urine, Midstream   Result Value Ref Range    Color Urine Yellow Colorless, Straw, Light Yellow, Yellow    Appearance Urine Cloudy (A) Clear    Glucose Urine Negative Negative mg/dL    Bilirubin Urine Negative Negative    Ketones Urine Negative Negative mg/dL    Specific Gravity Urine 1.015 1.005 - 1.030    Blood Urine Large (A) Negative    pH Urine 7.0 5.0 - 8.0    Protein Albumin Urine Negative Negative mg/dL    Urobilinogen Urine 0.2 0.2, 1.0 E.U./dL    Nitrite Urine Negative Negative    Leukocyte Esterase Urine Small (A) Negative   UA Microscopic with Reflex to Culture     Status: Abnormal   Result Value Ref Range    Bacteria Urine Few (A) None Seen /HPF    RBC Urine  (A) 0-2 /HPF /HPF    WBC Urine 10-25 (A) 0-5 /HPF /HPF    Squamous Epithelials Urine Few (A) None Seen /LPF           Signed Electronically by: DENNYS MARTINEZ MD    "

## 2024-04-06 LAB — BACTERIA UR CULT: NORMAL

## 2024-04-07 DIAGNOSIS — R31.0 GROSS HEMATURIA: Primary | ICD-10-CM

## 2024-04-16 ENCOUNTER — LAB (OUTPATIENT)
Dept: LAB | Facility: CLINIC | Age: 59
End: 2024-04-16
Payer: COMMERCIAL

## 2024-04-16 ENCOUNTER — HOSPITAL ENCOUNTER (OUTPATIENT)
Dept: CT IMAGING | Facility: HOSPITAL | Age: 59
Discharge: HOME | End: 2024-04-16
Attending: FAMILY MEDICINE | Admitting: FAMILY MEDICINE
Payer: COMMERCIAL

## 2024-04-16 DIAGNOSIS — N30.01 ACUTE CYSTITIS WITH HEMATURIA: ICD-10-CM

## 2024-04-16 DIAGNOSIS — R31.0 GROSS HEMATURIA: ICD-10-CM

## 2024-04-16 LAB
ALBUMIN UR-MCNC: NEGATIVE MG/DL
APPEARANCE UR: CLEAR
BILIRUB UR QL STRIP: NEGATIVE
COLOR UR AUTO: YELLOW
GLUCOSE UR STRIP-MCNC: NEGATIVE MG/DL
HGB UR QL STRIP: NEGATIVE
KETONES UR STRIP-MCNC: NEGATIVE MG/DL
LEUKOCYTE ESTERASE UR QL STRIP: NEGATIVE
NITRATE UR QL: NEGATIVE
PH UR STRIP: 6.5 [PH] (ref 5–8)
SP GR UR STRIP: 1.01 (ref 1–1.03)
UROBILINOGEN UR STRIP-ACNC: 0.2 E.U./DL

## 2024-04-16 PROCEDURE — 81003 URINALYSIS AUTO W/O SCOPE: CPT

## 2024-04-16 PROCEDURE — 250N000011 HC RX IP 250 OP 636: Performed by: FAMILY MEDICINE

## 2024-04-16 PROCEDURE — 74177 CT ABD & PELVIS W/CONTRAST: CPT

## 2024-04-16 PROCEDURE — 250N000009 HC RX 250: Performed by: FAMILY MEDICINE

## 2024-04-16 RX ORDER — IOPAMIDOL 755 MG/ML
90 INJECTION, SOLUTION INTRAVASCULAR ONCE
Status: COMPLETED | OUTPATIENT
Start: 2024-04-16 | End: 2024-04-16

## 2024-04-16 RX ADMIN — SODIUM CHLORIDE 90 ML: 9 INJECTION, SOLUTION INTRAVENOUS at 07:26

## 2024-04-16 RX ADMIN — IOPAMIDOL 90 ML: 755 INJECTION, SOLUTION INTRAVENOUS at 07:24

## 2024-04-17 ENCOUNTER — TELEPHONE (OUTPATIENT)
Dept: FAMILY MEDICINE | Facility: CLINIC | Age: 59
End: 2024-04-17
Payer: COMMERCIAL

## 2024-04-17 NOTE — TELEPHONE ENCOUNTER
----- Message from Karlie Macias MD sent at 4/17/2024  7:05 AM CDT -----  Please call patient and let her know that her CT scan saw a single, small kidney stone in the left kidney.  It is not obstructing and is quite tiny.  Making sure she is drinking plenty of water would be a good idea.  She has a few gallstones which we do not normally worry about unless they are symptomatic with recurrent episodes of right upper quadrant abdominal pain after eating.    With the urinalysis coming back showing no microscopic blood anymore, I would recommend at this time that she continue to monitor and if she sees any more blood that we proceed with a urology consultation.  Otherwise, I think we can simply monitor for now.    Please let me know if she has any questions.

## 2024-04-30 ENCOUNTER — ALLIED HEALTH/NURSE VISIT (OUTPATIENT)
Dept: FAMILY MEDICINE | Facility: CLINIC | Age: 59
End: 2024-04-30
Payer: COMMERCIAL

## 2024-04-30 VITALS — SYSTOLIC BLOOD PRESSURE: 138 MMHG | HEART RATE: 76 BPM | DIASTOLIC BLOOD PRESSURE: 81 MMHG | OXYGEN SATURATION: 96 %

## 2024-04-30 DIAGNOSIS — I10 ESSENTIAL HYPERTENSION: Primary | ICD-10-CM

## 2024-04-30 PROCEDURE — 99207 PR NO CHARGE NURSE ONLY: CPT

## 2024-04-30 RX ORDER — LISINOPRIL 10 MG/1
10 TABLET ORAL DAILY
Qty: 90 TABLET | Refills: 1 | Status: SHIPPED | OUTPATIENT
Start: 2024-04-30 | End: 2024-05-31

## 2024-04-30 NOTE — PROGRESS NOTES
Please let patient know that I just sent in a 90-day supply of lisinopril 10 mg daily.  Her blood pressure is looking much better although I may want to consider further adjustment in 6 months when I see her back for another blood pressure check as ideally we would like her systolic number between 110 and 129.

## 2024-04-30 NOTE — PROGRESS NOTES
"3/28 OV note-  Essential hypertension        Multiple BP readings of 160s/90s recorded in the clinic. Reports frequently hears \"BP is a little elevated\" when it is checked. Offered further monitoring vs. Initiating treatment. Recommend starting Lisinopril 10mg today and evaluate in 1 month.                I met with Andreia Bustamante at the request of Dr. Macias to recheck her blood pressure.  Blood pressure medications on the med list were reviewed with patient.    Patient has taken all medications as per usual regimen: Yes  Patient reports tolerating them without any issues or concerns: Yes    Vitals:    04/30/24 0830   BP: 138/81   BP Location: Left arm   Patient Position: Sitting   Cuff Size: Adult Large   Pulse: 76   SpO2: 96%       Blood pressure was taken, previous encounter was reviewed, recorded blood pressure below 140/90.  Patient was discharged and the note will be sent to the provider for final review.    "

## 2024-05-20 ENCOUNTER — OFFICE VISIT (OUTPATIENT)
Dept: FAMILY MEDICINE | Facility: CLINIC | Age: 59
End: 2024-05-20
Payer: COMMERCIAL

## 2024-05-20 VITALS
TEMPERATURE: 97.3 F | HEIGHT: 66 IN | DIASTOLIC BLOOD PRESSURE: 79 MMHG | WEIGHT: 238.9 LBS | HEART RATE: 81 BPM | OXYGEN SATURATION: 96 % | BODY MASS INDEX: 38.39 KG/M2 | RESPIRATION RATE: 18 BRPM | SYSTOLIC BLOOD PRESSURE: 115 MMHG

## 2024-05-20 DIAGNOSIS — E66.812 CLASS 2 SEVERE OBESITY DUE TO EXCESS CALORIES WITH SERIOUS COMORBIDITY AND BODY MASS INDEX (BMI) OF 39.0 TO 39.9 IN ADULT (H): Primary | ICD-10-CM

## 2024-05-20 DIAGNOSIS — E66.01 CLASS 2 SEVERE OBESITY DUE TO EXCESS CALORIES WITH SERIOUS COMORBIDITY AND BODY MASS INDEX (BMI) OF 39.0 TO 39.9 IN ADULT (H): Primary | ICD-10-CM

## 2024-05-20 DIAGNOSIS — I10 ESSENTIAL HYPERTENSION: ICD-10-CM

## 2024-05-20 DIAGNOSIS — R73.03 PREDIABETES: ICD-10-CM

## 2024-05-20 PROCEDURE — G2211 COMPLEX E/M VISIT ADD ON: HCPCS | Performed by: FAMILY MEDICINE

## 2024-05-20 PROCEDURE — 99215 OFFICE O/P EST HI 40 MIN: CPT | Performed by: FAMILY MEDICINE

## 2024-05-20 RX ORDER — DIETHYLPROPION HYDROCHLORIDE 75 MG/1
75 TABLET, EXTENDED RELEASE ORAL EVERY MORNING
Qty: 30 TABLET | Refills: 1 | Status: SHIPPED | OUTPATIENT
Start: 2024-05-20 | End: 2024-06-20

## 2024-05-20 NOTE — ASSESSMENT & PLAN NOTE
We had a good conversation today regarding approaching obesity as a chronic disease.  We discussed the multifactorial nature of this disease process and identified some of the barriers and challenges as well as contributing factors to her challenges in managing her weight.  I provided the patient with an individualized nutrition plan and we discussed the important role of exercise and weight management.  The patient is struggling with some significant stress which is affecting both anxiety and mood at this time but I do think that her plan to see a psychotherapist starting in June is a good one but will need to monitor that as well.  We discussed medication options to address her obesity.  It does not look like weight loss medications are covered by her and her insurance and so a GLP-1  medication does not look like an option currently.  Ultimately, prescription for diethylpropion 75 mg once daily in the morning was provided and I asked her to follow-up in 1 month for weight loss follow-up.

## 2024-05-20 NOTE — PROGRESS NOTES
"    New Medical Weight Management Consult    PATIENT:  Andreia Bustamante  MRN:         [id number]  :         1965  CAYLA:         2024    Full intake/assessment was done to determine barriers to weight loss success and develop a treatment plan. Andreia Bustamante is a 59 year old female interested in treatment of medical problems associated with excess weight. She has a height of 5' 5.5\", a weight of 238 lbs 14.4 oz, and the calculated Body mass index is 39.15 kg/m .    ASSESSMENT/PLAN:  Problem List Items Addressed This Visit       Essential hypertension     Blood pressure appears to be under much better control on lisinopril 10 mg daily.         Class 2 severe obesity due to excess calories with serious comorbidity in adult (H) - Primary     We had a good conversation today regarding approaching obesity as a chronic disease.  We discussed the multifactorial nature of this disease process and identified some of the barriers and challenges as well as contributing factors to her challenges in managing her weight.  I provided the patient with an individualized nutrition plan and we discussed the important role of exercise and weight management.  The patient is struggling with some significant stress which is affecting both anxiety and mood at this time but I do think that her plan to see a psychotherapist starting in  is a good one but will need to monitor that as well.  We discussed medication options to address her obesity.  It does not look like weight loss medications are covered by her and her insurance and so a GLP-1  medication does not look like an option currently.  Ultimately, prescription for diethylpropion 75 mg once daily in the morning was provided and I asked her to follow-up in 1 month for weight loss follow-up.         Relevant Medications    Diethylpropion HCl CR 75 MG TB24    Prediabetes         She has the following co-morbidities:        2024    10:49 AM   --   I have the following health " "issues associated with obesity Pre-Diabetes    High Blood Pressure    Sleep Apnea   I have the following symptoms associated with obesity Back Pain    Fatigue            No data to display                    5/17/2024    10:49 AM   Referring Provider   Please name the provider who referred you to Medical Weight Management  If you do not know, please answer \"I Don't Know\" Self           5/17/2024    10:49 AM   Weight History   How concerned are you about your weight? Very Concerned   I became overweight As an Adult   The following factors have contributed to my weight gain Mental Health Issues    Eating Wrong Types of Food    Eating Too Much    Lack of Exercise    Genetic (Runs in the Family)    Stress   I have tried the following methods to lose weight Watching Portions or Calories    Exercise    Weight Watchers    Atkins-type Diet (Low Carb/High Protein)   My lowest weight since age 20 was 135   My highest weight since age 20 was 254   The most weight I have ever lost was (lbs) 109   I have the following family history of obesity/being overweight My mother is overweight    My father is overweight    One or more of my siblings are overweight    Many of my relatives are overweight   How has your weight changed over the last year? Gained   How many pounds? 50+           5/17/2024    10:49 AM   Diet Recall Review with Patient   If you do eat breakfast, what types of food do you eat? Pittman egg and cheese biscuit, donuts, muffins, PB toast   If you do eat lunch, what types of food do you typically eat? Cheeseburger, fries, chicken sandwich, Chipotle   If you do eat supper, what types of food do you typically eat? Varies--well rounded meals, pizza, sometimes we go out   If you do snack, what types of food do you typically eat? Sweets always--cookies, candy (peanut M&Ms), brownies   How many glasses of juice do you drink in a typical day? 0   How many of glasses of milk do you drink in a typical day? 0   How many 8oz " glasses of sugar containing drinks such as Riki-Aid/sweet tea do you drink in a day? 0   How many cans/bottles of sugar pop/soda/tea/sports drinks do you drink in a day? 0   How many cans/bottles of diet pop/soda/tea or sports drink do you drink in a day? 4   How often do you have a drink of alcohol? Never           5/17/2024    10:49 AM   Eating Habits   Generally, my meals include foods like these bread, pasta, rice, potatoes, corn, crackers, sweet dessert, pop, or juice A Few Times a Week   Generally, my meals include foods like these fried meats, brats, burgers, french fries, pizza, cheese, chips, or ice cream Almost Everyday   Eat at a buffet or sit-down restaurant A Few Times a Week   Eat most of my meals in front of the TV or computer A Few Times a Week   Often skip meals, eat at random times, have no regular eating times Never   Rarely sit down for a meal but snack or graze throughout Never   Eat extra snacks between meals Everyday   Eat most of my food at the end of the day Less Than Weekly   Eat in the middle of the night or wake up at night to eat Never   Eat extra snacks to prevent or correct low blood sugar Never   Eat to prevent acid reflux or stomach pain Never   Worry about not having enough food to eat Never   I eat when I am depressed Almost Everyday   I eat when I am stressed Almost Everyday   I eat when I am bored Almost Everyday   I eat when I am anxious Almost Everyday   I eat when I am happy or as a reward Almost Everyday   I feel hungry all the time even if I just have eaten A Few Times a Week   Feeling full is important to me Everyday   I finish all the food on my plate even if I am already full Almost Everyday   I can't resist eating delicious food or walk past the good food/smell Everyday   I eat/snack without noticing that I am eating Almost Everyday   I eat when I am preparing the meal Less Than Weekly   I eat more than usual when I see others eating Almost Everyday   I have trouble not  eating sweets, ice cream, cookies, or chips if they are around the house Everyday   I think about food all day Everyday   What foods, if any, do you crave? Sweets/Candy/Chocolate           5/17/2024    10:49 AM   Amount of Food   I feel out of control when eating Almost Everyday   I eat a large amount of food, like a loaf of bread, a box of cookies, a pint/quart of ice cream, all at once Never   I eat a large amount of food even when I am not hungry Weekly   I eat rapidly Almost Everyday   I eat alone because I feel embarrassed and do not want others to see how much I have eaten Weekly   I eat until I am uncomfortably full Almost Everyday   I feel bad, disgusted, or guilty after I overeat Almost Everyday           5/17/2024    10:49 AM   Activity/Exercise History   How much of a typical 12 hour day do you spend sitting? Most of the Day   How much of a typical 12 hour day do you spend lying down? Less Than Half the Day   How much of a typical day do you spend walking/standing? Less Than Half the Day   How many hours (not including work) do you spend on the TV/Video Games/Computer/Tablet/Phone? 2-3 Hours   How many times a week are you active for the purpose of exercise? Never   What keeps you from being more active? Other   How many total minutes do you spend doing some activity for the purpose of exercising when you exercise? More Than 30 Minutes       PAST MEDICAL HISTORY:  Past Medical History:   Diagnosis Date    Hypertension June 2021    Treated with medication           5/17/2024    10:49 AM   Work/Social History Reviewed With Patient   My employment status is Full-Time   My job is [employer]    How much of your job is spent on the computer or phone? 75%   How many hours do you spend commuting to work daily? 10 minutes   What is your marital status? /In a Relationship   If in a relationship, is your significant other overweight? Yes   If you have children, are they overweight? No   Who do you live  "with?  and teenage daughter   Who does the food shopping? Me           5/17/2024    10:49 AM   Mental Health History Reviewed With Patient   Have you ever been physically or sexually abused? No   How often in the past 2 weeks have you felt little interest or pleasure in doing things? For Several Days   Over the past 2 weeks how often have you felt down, depressed, or hopeless? For Several Days           5/17/2024    10:49 AM   Sleep History Reviewed With Patient   How many hours do you sleep at night? 6       MEDICATIONS:   Current Outpatient Medications   Medication Sig Dispense Refill    aspirin (ASA) 325 MG tablet Take 1 tablet by mouth once daily with a meal.      lisinopril (ZESTRIL) 10 MG tablet Take 1 tablet (10 mg) by mouth daily 90 tablet 1    loratadine (CLARITIN) 10 MG tablet Take 10 mg by mouth daily      Multiple Vitamin (MULTIVITAMIN PO) Take 1 tab by oral route once daily with food.         ALLERGIES:   Allergies   Allergen Reactions    Dust Mites Unknown    Pollen Extract      Other reaction(s): Runny Nose    Seasonal Allergies      Other reaction(s): Runny Nose       PHYSICAL EXAM:  /79 (BP Location: Left arm, Patient Position: Left side, Cuff Size: Adult Large)   Pulse 81   Temp 97.3  F (36.3  C) (Oral)   Resp 18   Ht 1.664 m (5' 5.5\")   Wt 108.4 kg (238 lb 14.4 oz)   LMP  (LMP Unknown)   SpO2 96%   BMI 39.15 kg/m      Waist circumference:      Wt Readings from Last 4 Encounters:   05/20/24 108.4 kg (238 lb 14.4 oz)   04/04/24 105.9 kg (233 lb 8 oz)   03/28/24 105.7 kg (233 lb)   03/02/23 87.4 kg (192 lb 11.2 oz)     A & O x 3  HEENT: NCAT, mucous membranes moist  Respirations unlabored  Location of obesity: Central Obesity    FOLLOW-UP:   4 weeks.    TIME: 48 min spent on evaluation, management, counseling, education, & motivational interviewing with greater than 50 % of the total time was spent on counseling and coordinating care    The longitudinal plan of care for the " diagnosis(es)/condition(s) as documented were addressed during this visit. Due to the added complexity in care, I will continue to support Andreia in the subsequent management and with ongoing continuity of care.

## 2024-05-22 ENCOUNTER — TELEPHONE (OUTPATIENT)
Dept: FAMILY MEDICINE | Facility: CLINIC | Age: 59
End: 2024-05-22
Payer: COMMERCIAL

## 2024-05-22 NOTE — TELEPHONE ENCOUNTER
Prior Authorization Request      Who s requesting: Pharmacy     Pharmacy Name and Location: [facility]    Medication Name: Diethylpropion HCl CR 75 MG TB24     Insurance Plan:   [employer]       Insurance Member ID Number:  [id number]     CoverMyMeds Key: No    Informed patient that prior authorizations can take up to 10 business days for response:   NA    Okay to leave a detailed message: No         Route to pool:  P [facility]

## 2024-05-31 ENCOUNTER — MYC MEDICAL ADVICE (OUTPATIENT)
Dept: FAMILY MEDICINE | Facility: CLINIC | Age: 59
End: 2024-05-31
Payer: COMMERCIAL

## 2024-05-31 DIAGNOSIS — I10 ESSENTIAL HYPERTENSION: ICD-10-CM

## 2024-06-02 RX ORDER — LISINOPRIL 10 MG/1
10 TABLET ORAL DAILY
Qty: 90 TABLET | Refills: 3 | Status: SHIPPED | OUTPATIENT
Start: 2024-06-02

## 2024-06-03 NOTE — TELEPHONE ENCOUNTER
Central Prior Authorization Team - Phone: [phone]     PA Initiation    Medication: DIETHYLPROPION HCL ER 75 MG PO TB24  Insurance Company: [employer] ([facility]) - Phone [phone] Fax [phone]  Pharmacy Filling the Rx: [id number] [place], [place] - [address]  Filling Pharmacy Phone: [phone]  Filling Pharmacy Fax:    Start Date: 6/3/2024

## 2024-06-06 NOTE — TELEPHONE ENCOUNTER
Central Prior Authorization Team - Phone: [phone]     PRIOR AUTHORIZATION DENIED    Medication: DIETHYLPROPION HCL ER 75 MG PO TB24  Insurance Company: [employer] ([facility]) - Phone [phone] Fax [phone]  Denial Date: 6/3/2024    Denial Reason(s): excluded drug. Not eligible for PA/appeal        Appeal Information: excluded drugs not eligible for PA/appeals      Patient Notified: NO  Unfortunately, we cannot call the patient with denials because we do not know what next steps the MD will take nor can we give medical advice, please notify the patient of what they are to expect for the continuation of their therapy from the provider.

## 2024-06-20 ENCOUNTER — OFFICE VISIT (OUTPATIENT)
Dept: FAMILY MEDICINE | Facility: CLINIC | Age: 59
End: 2024-06-20
Payer: COMMERCIAL

## 2024-06-20 VITALS
HEART RATE: 75 BPM | HEIGHT: 66 IN | BODY MASS INDEX: 35.36 KG/M2 | OXYGEN SATURATION: 98 % | SYSTOLIC BLOOD PRESSURE: 132 MMHG | RESPIRATION RATE: 14 BRPM | TEMPERATURE: 97.8 F | DIASTOLIC BLOOD PRESSURE: 82 MMHG | WEIGHT: 220 LBS

## 2024-06-20 DIAGNOSIS — E66.812 CLASS 2 SEVERE OBESITY DUE TO EXCESS CALORIES WITH SERIOUS COMORBIDITY AND BODY MASS INDEX (BMI) OF 39.0 TO 39.9 IN ADULT (H): ICD-10-CM

## 2024-06-20 DIAGNOSIS — E66.01 CLASS 2 SEVERE OBESITY DUE TO EXCESS CALORIES WITH SERIOUS COMORBIDITY AND BODY MASS INDEX (BMI) OF 39.0 TO 39.9 IN ADULT (H): ICD-10-CM

## 2024-06-20 PROCEDURE — 99213 OFFICE O/P EST LOW 20 MIN: CPT | Performed by: FAMILY MEDICINE

## 2024-06-20 PROCEDURE — G2211 COMPLEX E/M VISIT ADD ON: HCPCS | Performed by: FAMILY MEDICINE

## 2024-06-20 RX ORDER — DIETHYLPROPION HYDROCHLORIDE 75 MG/1
75 TABLET, EXTENDED RELEASE ORAL EVERY MORNING
Qty: 90 TABLET | Refills: 0 | Status: SHIPPED | OUTPATIENT
Start: 2024-06-20

## 2024-06-20 NOTE — PROGRESS NOTES
"  Assessment & Plan   Problem List Items Addressed This Visit       Class 2 severe obesity due to excess calories with serious comorbidity in adult (H)     The patient has lost nearly 19 pounds in the past month with the support of Diethylpropion. She is tolerating the medication well at this point and does find that it is helpful with cravings in particular. She is following the nutrition plan well and is getting exercise. Continue current plan and follow up in one month with goals set for the month.          Relevant Medications    Diethylpropion HCl CR 75 MG TB24             BMI  Estimated body mass index is 36.05 kg/m  as calculated from the following:    Height as of this encounter: 1.664 m (5' 5.5\").    Weight as of this encounter: 99.8 kg (220 lb).         Goals:     1) Nutrition goal: more fruit/veggies when feeling hungry    2) Exercise: Increase exercise to 4-5 times per week    3) Mindfulness: Getting to bed earlier    Ilene Boswell is a 59 year old who presents today for a one month follow up regarding medical weight management. She is doing well and tolerating Diethylpropion, although did have some difficulty sleeping initially. She is finding the medication to be helpful with cravings and is following the nutrition plan. She does have times that she feels hungry.   Weight Loss      6/20/2024     3:12 PM   Additional Questions   Roomed by as   Accompanied by self         6/20/2024     3:12 PM   Patient Reported Additional Medications   Patient reports taking the following new medications no     History of Present Illness       Reason for visit:  Weight and med check    She eats 2-3 servings of fruits and vegetables daily.She consumes 0 sweetened beverage(s) daily.She exercises with enough effort to increase her heart rate 30 to 60 minutes per day.  She exercises with enough effort to increase her heart rate 3 or less days per week.   She is taking medications regularly.           Objective    BP " "132/82 (BP Location: Left arm, Patient Position: Left side, Cuff Size: Adult Large)   Pulse 75   Temp 97.8  F (36.6  C) (Oral)   Resp 14   Ht 1.664 m (5' 5.5\")   Wt 99.8 kg (220 lb)   LMP  (LMP Unknown)   SpO2 98%   BMI 36.05 kg/m    Body mass index is 36.05 kg/m .  Physical Exam   GENERAL: alert and no distress            Signed Electronically by: DENNYS MARTINEZ MD    "

## 2024-06-23 NOTE — ASSESSMENT & PLAN NOTE
The patient has lost nearly 19 pounds in the past month with the support of Diethylpropion. She is tolerating the medication well at this point and does find that it is helpful with cravings in particular. She is following the nutrition plan well and is getting exercise. Continue current plan and follow up in one month with goals set for the month.

## 2024-07-31 ENCOUNTER — OFFICE VISIT (OUTPATIENT)
Dept: FAMILY MEDICINE | Facility: CLINIC | Age: 59
End: 2024-07-31
Payer: COMMERCIAL

## 2024-07-31 VITALS
BODY MASS INDEX: 32.62 KG/M2 | SYSTOLIC BLOOD PRESSURE: 139 MMHG | TEMPERATURE: 97.6 F | OXYGEN SATURATION: 96 % | DIASTOLIC BLOOD PRESSURE: 84 MMHG | RESPIRATION RATE: 16 BRPM | HEART RATE: 70 BPM | HEIGHT: 66 IN | WEIGHT: 203 LBS

## 2024-07-31 DIAGNOSIS — E66.811 CLASS 1 OBESITY DUE TO EXCESS CALORIES WITH SERIOUS COMORBIDITY AND BODY MASS INDEX (BMI) OF 33.0 TO 33.9 IN ADULT: ICD-10-CM

## 2024-07-31 DIAGNOSIS — G47.33 OBSTRUCTIVE SLEEP APNEA: Primary | ICD-10-CM

## 2024-07-31 DIAGNOSIS — I10 ESSENTIAL HYPERTENSION: ICD-10-CM

## 2024-07-31 DIAGNOSIS — E66.09 CLASS 1 OBESITY DUE TO EXCESS CALORIES WITH SERIOUS COMORBIDITY AND BODY MASS INDEX (BMI) OF 33.0 TO 33.9 IN ADULT: ICD-10-CM

## 2024-07-31 PROCEDURE — 99214 OFFICE O/P EST MOD 30 MIN: CPT | Performed by: FAMILY MEDICINE

## 2024-07-31 PROCEDURE — G2211 COMPLEX E/M VISIT ADD ON: HCPCS | Performed by: FAMILY MEDICINE

## 2024-07-31 NOTE — ASSESSMENT & PLAN NOTE
Pressure is borderline and we will continue to monitor.  I encouraged the patient to get a home blood pressure machine in order to keep an eye on it at home and then send me readings.  We will follow-up again in 2 months and if the blood pressure remains borderline elevated consider an adjustment to her blood pressure medication.

## 2024-07-31 NOTE — PROGRESS NOTES
"  Assessment & Plan   Problem List Items Addressed This Visit       Essential hypertension     Pressure is borderline and we will continue to monitor.  I encouraged the patient to get a home blood pressure machine in order to keep an eye on it at home and then send me readings.  We will follow-up again in 2 months and if the blood pressure remains borderline elevated consider an adjustment to her blood pressure medication.         Obstructive sleep apnea - Primary    Class 1 obesity due to excess calories with serious comorbidity and body mass index (BMI) of 33.0 to 33.9 in adult     The patient has had another successful months of weight loss with a total weight loss of 35 pounds at this point with the support of diethylpropion.  She is tolerating the medication well without any side effects and continues to find it helpful with curbing appetite as well as improving satiety.  She is also been very physically active and has been incorporating more vegetables and some fruit into her diet.  We discussed expectations as well as focusing on long-term changes to eating behaviors and diet in order to maintain weight loss.  I asked her to follow-up in 2 months and we will continue diethylpropion at this time.                  BMI  Estimated body mass index is 33.27 kg/m  as calculated from the following:    Height as of this encounter: 1.664 m (5' 5.5\").    Weight as of this encounter: 92.1 kg (203 lb).         Ilene Boswell is a 59 year old who presents today for follow-up regarding medical weight management.  I saw the patient in June at which time she had very good response to diethylpropion.  We set some short-term weight loss goals at that time including incorporating more fruits and vegetables into her diet, increasing her exercise to 4-5 times per week as well as getting to bed earlier.  She feels she has done well with these goals for the most part.  She continues to feel well and find the medication helpful " "at curbing appetite and improving satiety.  Weight Loss (/)      7/31/2024     7:39 AM   Additional Questions   Roomed by as   Accompanied by self         7/31/2024     7:39 AM   Patient Reported Additional Medications   Patient reports taking the following new medications no     History of Present Illness       Reason for visit:  Weight management    She eats 2-3 servings of fruits and vegetables daily.She consumes 0 sweetened beverage(s) daily.She exercises with enough effort to increase her heart rate 30 to 60 minutes per day.  She exercises with enough effort to increase her heart rate 3 or less days per week.   She is taking medications regularly.           Objective    BP (!) 143/83 (BP Location: Left arm, Patient Position: Left side, Cuff Size: Adult Large)   Pulse 70   Temp 97.6  F (36.4  C) (Oral)   Resp 16   Ht 1.664 m (5' 5.5\")   Wt 92.1 kg (203 lb)   LMP  (LMP Unknown)   SpO2 96%   BMI 33.27 kg/m    Body mass index is 33.27 kg/m .  Physical Exam   GENERAL: alert and no distress            Signed Electronically by: DENNYS MARTINEZ MD    "

## 2024-07-31 NOTE — ASSESSMENT & PLAN NOTE
EKG faxed to [facility], Dr Nathan Pham at [phone]   The patient has had another successful months of weight loss with a total weight loss of 35 pounds at this point with the support of diethylpropion.  She is tolerating the medication well without any side effects and continues to find it helpful with curbing appetite as well as improving satiety.  She is also been very physically active and has been incorporating more vegetables and some fruit into her diet.  We discussed expectations as well as focusing on long-term changes to eating behaviors and diet in order to maintain weight loss.  I asked her to follow-up in 2 months and we will continue diethylpropion at this time.

## 2025-03-04 ENCOUNTER — PATIENT OUTREACH (OUTPATIENT)
Dept: CARE COORDINATION | Facility: CLINIC | Age: 60
End: 2025-03-04
Payer: COMMERCIAL

## 2025-03-18 ENCOUNTER — PATIENT OUTREACH (OUTPATIENT)
Dept: CARE COORDINATION | Facility: CLINIC | Age: 60
End: 2025-03-18
Payer: COMMERCIAL

## 2025-03-28 ENCOUNTER — MYC REFILL (OUTPATIENT)
Dept: FAMILY MEDICINE | Facility: CLINIC | Age: 60
End: 2025-03-28
Payer: COMMERCIAL

## 2025-03-28 ENCOUNTER — ANCILLARY PROCEDURE (OUTPATIENT)
Dept: MAMMOGRAPHY | Facility: CLINIC | Age: 60
End: 2025-03-28
Attending: FAMILY MEDICINE
Payer: COMMERCIAL

## 2025-03-28 DIAGNOSIS — Z12.31 VISIT FOR SCREENING MAMMOGRAM: ICD-10-CM

## 2025-03-28 DIAGNOSIS — E66.812 CLASS 2 SEVERE OBESITY DUE TO EXCESS CALORIES WITH SERIOUS COMORBIDITY AND BODY MASS INDEX (BMI) OF 39.0 TO 39.9 IN ADULT (H): ICD-10-CM

## 2025-03-28 DIAGNOSIS — E66.01 CLASS 2 SEVERE OBESITY DUE TO EXCESS CALORIES WITH SERIOUS COMORBIDITY AND BODY MASS INDEX (BMI) OF 39.0 TO 39.9 IN ADULT (H): ICD-10-CM

## 2025-03-28 PROCEDURE — 77067 SCR MAMMO BI INCL CAD: CPT

## 2025-03-28 PROCEDURE — 77063 BREAST TOMOSYNTHESIS BI: CPT

## 2025-03-30 RX ORDER — DIETHYLPROPION HYDROCHLORIDE 75 MG/1
75 TABLET, EXTENDED RELEASE ORAL EVERY MORNING
Qty: 30 TABLET | Refills: 0 | Status: SHIPPED | OUTPATIENT
Start: 2025-03-30

## 2025-05-05 ENCOUNTER — PATIENT OUTREACH (OUTPATIENT)
Dept: CARE COORDINATION | Facility: CLINIC | Age: 60
End: 2025-05-05
Payer: COMMERCIAL

## 2025-06-05 SDOH — HEALTH STABILITY: PHYSICAL HEALTH: ON AVERAGE, HOW MANY DAYS PER WEEK DO YOU ENGAGE IN MODERATE TO STRENUOUS EXERCISE (LIKE A BRISK WALK)?: 0 DAYS

## 2025-06-05 SDOH — HEALTH STABILITY: PHYSICAL HEALTH: ON AVERAGE, HOW MANY MINUTES DO YOU ENGAGE IN EXERCISE AT THIS LEVEL?: 0 MIN

## 2025-06-05 ASSESSMENT — SOCIAL DETERMINANTS OF HEALTH (SDOH): HOW OFTEN DO YOU GET TOGETHER WITH FRIENDS OR RELATIVES?: TWICE A WEEK

## 2025-06-09 ENCOUNTER — OFFICE VISIT (OUTPATIENT)
Dept: FAMILY MEDICINE | Facility: CLINIC | Age: 60
End: 2025-06-09
Attending: FAMILY MEDICINE
Payer: COMMERCIAL

## 2025-06-09 VITALS
WEIGHT: 236.6 LBS | OXYGEN SATURATION: 97 % | HEIGHT: 66 IN | TEMPERATURE: 98 F | SYSTOLIC BLOOD PRESSURE: 133 MMHG | HEART RATE: 71 BPM | RESPIRATION RATE: 12 BRPM | DIASTOLIC BLOOD PRESSURE: 84 MMHG | BODY MASS INDEX: 38.02 KG/M2

## 2025-06-09 DIAGNOSIS — E66.812 CLASS 2 SEVERE OBESITY WITH SERIOUS COMORBIDITY AND BODY MASS INDEX (BMI) OF 37.0 TO 37.9 IN ADULT, UNSPECIFIED OBESITY TYPE (H): ICD-10-CM

## 2025-06-09 DIAGNOSIS — E66.01 CLASS 2 SEVERE OBESITY WITH SERIOUS COMORBIDITY AND BODY MASS INDEX (BMI) OF 37.0 TO 37.9 IN ADULT, UNSPECIFIED OBESITY TYPE (H): ICD-10-CM

## 2025-06-09 DIAGNOSIS — Z12.11 SCREEN FOR COLON CANCER: ICD-10-CM

## 2025-06-09 DIAGNOSIS — R73.03 PREDIABETES: ICD-10-CM

## 2025-06-09 DIAGNOSIS — G47.33 OBSTRUCTIVE SLEEP APNEA: ICD-10-CM

## 2025-06-09 DIAGNOSIS — Z00.00 ROUTINE GENERAL MEDICAL EXAMINATION AT A HEALTH CARE FACILITY: Primary | ICD-10-CM

## 2025-06-09 DIAGNOSIS — I10 ESSENTIAL HYPERTENSION: ICD-10-CM

## 2025-06-09 PROBLEM — R01.1 HEART MURMUR: Status: RESOLVED | Noted: 2018-08-15 | Resolved: 2025-06-09

## 2025-06-09 LAB
EST. AVERAGE GLUCOSE BLD GHB EST-MCNC: 105 MG/DL
HBA1C MFR BLD: 5.3 % (ref 0–5.6)

## 2025-06-09 PROCEDURE — G2211 COMPLEX E/M VISIT ADD ON: HCPCS | Performed by: FAMILY MEDICINE

## 2025-06-09 PROCEDURE — 36415 COLL VENOUS BLD VENIPUNCTURE: CPT | Performed by: FAMILY MEDICINE

## 2025-06-09 PROCEDURE — 3079F DIAST BP 80-89 MM HG: CPT | Performed by: FAMILY MEDICINE

## 2025-06-09 PROCEDURE — 3049F LDL-C 100-129 MG/DL: CPT | Performed by: FAMILY MEDICINE

## 2025-06-09 PROCEDURE — 83036 HEMOGLOBIN GLYCOSYLATED A1C: CPT | Performed by: FAMILY MEDICINE

## 2025-06-09 PROCEDURE — 3075F SYST BP GE 130 - 139MM HG: CPT | Performed by: FAMILY MEDICINE

## 2025-06-09 PROCEDURE — 90471 IMMUNIZATION ADMIN: CPT | Performed by: FAMILY MEDICINE

## 2025-06-09 PROCEDURE — 3044F HG A1C LEVEL LT 7.0%: CPT | Performed by: FAMILY MEDICINE

## 2025-06-09 PROCEDURE — 99396 PREV VISIT EST AGE 40-64: CPT | Mod: 25 | Performed by: FAMILY MEDICINE

## 2025-06-09 PROCEDURE — 80061 LIPID PANEL: CPT | Performed by: FAMILY MEDICINE

## 2025-06-09 PROCEDURE — 90677 PCV20 VACCINE IM: CPT | Performed by: FAMILY MEDICINE

## 2025-06-09 PROCEDURE — 99214 OFFICE O/P EST MOD 30 MIN: CPT | Mod: 25 | Performed by: FAMILY MEDICINE

## 2025-06-09 RX ORDER — LISINOPRIL 20 MG/1
20 TABLET ORAL DAILY
Qty: 90 TABLET | Refills: 1 | Status: SHIPPED | OUTPATIENT
Start: 2025-06-09 | End: 2025-06-10

## 2025-06-09 NOTE — PATIENT INSTRUCTIONS
Patient Education   Preventive Care Advice   This is general advice given by our system to help you stay healthy. However, your care team may have specific advice just for you. Please talk to your care team about your preventive care needs.  Nutrition  Eat 5 or more servings of fruits and vegetables each day.  Try wheat bread, brown rice and whole grain pasta (instead of white bread, rice, and pasta).  Get enough calcium and vitamin D. Check the label on foods and aim for 100% of the RDA (recommended daily allowance).  Lifestyle  Exercise at least 150 minutes each week  (30 minutes a day, 5 days a week).  Do muscle strengthening activities 2 days a week. These help control your weight and prevent disease.  No smoking.  Wear sunscreen to prevent skin cancer.  Have a dental exam and cleaning every 6 months.  Yearly exams  See your health care team every year to talk about:  Any changes in your health.  Any medicines your care team has prescribed.  Preventive care, family planning, and ways to prevent chronic diseases.  Shots (vaccines)   HPV shots (up to age 26), if you've never had them before.  Hepatitis B shots (up to age 59), if you've never had them before.  COVID-19 shot: Get this shot when it's due.  Flu shot: Get a flu shot every year.  Tetanus shot: Get a tetanus shot every 10 years.  Pneumococcal, hepatitis A, and RSV shots: Ask your care team if you need these based on your risk.  Shingles shot (for age 50 and up)  General health tests  Diabetes screening:  Starting at age 35, Get screened for diabetes at least every 3 years.  If you are younger than age 35, ask your care team if you should be screened for diabetes.  Cholesterol test: At age 39, start having a cholesterol test every 5 years, or more often if advised.  Bone density scan (DEXA): At age 50, ask your care team if you should have this scan for osteoporosis (brittle bones).  Hepatitis C: Get tested at least once in your life.  STIs (sexually  transmitted infections)  Before age 24: Ask your care team if you should be screened for STIs.  After age 24: Get screened for STIs if you're at risk. You are at risk for STIs (including HIV) if:  You are sexually active with more than one person.  You don't use condoms every time.  You or a partner was diagnosed with a sexually transmitted infection.  If you are at risk for HIV, ask about PrEP medicine to prevent HIV.  Get tested for HIV at least once in your life, whether you are at risk for HIV or not.  Cancer screening tests  Cervical cancer screening: If you have a cervix, begin getting regular cervical cancer screening tests starting at age 21.  Breast cancer scan (mammogram): If you've ever had breasts, begin having regular mammograms starting at age 40. This is a scan to check for breast cancer.  Colon cancer screening: It is important to start screening for colon cancer at age 45.  Have a colonoscopy test every 10 years (or more often if you're at risk) Or, ask your provider about stool tests like a FIT test every year or Cologuard test every 3 years.  To learn more about your testing options, visit:   .  For help making a decision, visit:   https://bit.ly/us50482.  Prostate cancer screening test: If you have a prostate, ask your care team if a prostate cancer screening test (PSA) at age 55 is right for you.  Lung cancer screening: If you are a current or former smoker ages 50 to 80, ask your care team if ongoing lung cancer screenings are right for you.  For informational purposes only. Not to replace the advice of your health care provider. Copyright   2023 [facility]. All rights reserved. Clinically reviewed by the [facility] Transitions Program. [employer] 225141 - REV 01/24.

## 2025-06-09 NOTE — Clinical Note
Transmission to pharmacy failed for prescribed meds. Please contact patient and confirm preferred alt pharmacy. Route back to ST RN pool so med can be resent to preferred pharmacy.

## 2025-06-09 NOTE — PROGRESS NOTES
Preventive Care Visit  [facility]  DENNYS MARTINEZ MD, Family Medicine  Jun 9, 2025      Assessment & Plan   Assessment & Plan  Routine general medical examination at a health care facility  The patient is up-to-date on her vaccines with the exception of COVID which she declines today and Prevnar which she received today.  She is up-to-date on cancer screening although is going to be due soon for a colonoscopy and an order was placed.  We reviewed bone health as a relates to calcium and vitamin D intake.  Orders:    Lipid Profile (Chol, Trig, HDL, LDL calc); Future    Essential hypertension  The patient's blood pressure appears to be under suboptimal control and I recommended increasing lisinopril to 20 mg daily.  Orders:    lisinopril (ZESTRIL) 20 MG tablet; Take 1 tablet (20 mg) by mouth daily.    Class 2 severe obesity with serious comorbidity and body mass index (BMI) of 37.0 to 37.9 in adult, unspecified obesity type (H)  We had a long conversation today as it relates to approaching obesity is a chronic disease.  The patient has a pattern of being quite focused and diligent with logging her foods and making good choices.  However, she struggles to sustain this over time.  We discussed the black-and-white thinking and trying to work on incorporating more healthy foods as well as avoiding having the sweet treats at home at least.  Discussed the important role of exercise and weight management and encouraged her to get back into a good exercise routine.  We discussed medications that may be helpful and ultimately a prescription for Wegovy was provided.  Await prior authorization to see if insurance is going to cover that.  Orders:    semaglutide-weight management (WEGOVY) 0.25 MG/0.5ML pen; Inject 0.5 mLs (0.25 mg) subcutaneously once a week.    Prediabetes  A1c checked today.  Orders:    Hemoglobin A1c; Future    Screen for colon cancer    Orders:    Colonoscopy Screening   "Referral; Future    Obstructive sleep apnea  This was mild and the patient opted not to treat.              BMI  Estimated body mass index is 38.77 kg/m  as calculated from the following:    Height as of this encounter: 1.664 m (5' 5.5\").    Weight as of this encounter: 107.3 kg (236 lb 9.6 oz).   Weight management plan: Discussed healthy diet and exercise guidelines    Counseling  Appropriate preventive services were addressed with this patient via screening, questionnaire, or discussion as appropriate for fall prevention, nutrition, physical activity, Tobacco-use cessation, social engagement, weight loss and cognition.  Checklist reviewing preventive services available has been given to the patient.  Reviewed patient's diet, addressing concerns and/or questions.       Ilene Boswell is a 60 year old who presents today for an annual physical exam.  The patient states overall she is doing well.  However, she expresses frustration regarding her weight.  The patient had been doing quite well last year with losing weight.  However, she states that similar to her pattern in the past, she gets tired of logging her foods and restriction and then goes back to her old habits.  The patient states that she is not necessarily eating all that healthy currently.  She admits that she has quite a sweet tooth and has been eating more processed food recently.  In addition, she has not been exercising recently.  She is interested in talking about medication that might be helpful.  Physical        6/9/2025     3:56 PM   Additional Questions   Roomed by as   Accompanied by self         6/9/2025     3:56 PM   Patient Reported Additional Medications   Patient reports taking the following new medications no          Advance Care Planning    Discussed advance care planning with patient; informed AVS has link to [facility].        6/5/2025   General Health   How would you rate your overall physical health? Good   Feel stress " (tense, anxious, or unable to sleep) Only a little   (!) STRESS CONCERN      6/5/2025   Nutrition   Three or more servings of calcium each day? Yes   Diet: Regular (no restrictions)   How many servings of fruit and vegetables per day? (!) 0-1   How many sweetened beverages each day? 0-1         6/5/2025   Exercise   Days per week of moderate/strenous exercise 0 days   Average minutes spent exercising at this level 0 min   (!) EXERCISE CONCERN      6/5/2025   Social Factors   Frequency of gathering with friends or relatives Twice a week   Worry food won't last until get money to buy more No   Food not last or not have enough money for food? No   Do you have housing? (Housing is defined as stable permanent housing and does not include staying outside in a car, in a tent, in an abandoned building, in an overnight shelter, or couch-surfing.) Yes   Are you worried about losing your housing? No   Lack of transportation? No   Unable to get utilities (heat,electricity)? No         6/5/2025   Fall Risk   Fallen 2 or more times in the past year? No   Trouble with walking or balance? No          6/5/2025   Dental   Dentist two times every year? Yes         Today's PHQ-2 Score:       6/9/2025     3:47 PM   PHQ-2 ( 1999 [employer])   Q1: Little interest or pleasure in doing things 0   Q2: Feeling down, depressed or hopeless 0   PHQ-2 Score 0    Q1: Little interest or pleasure in doing things Not at all   Q2: Feeling down, depressed or hopeless Not at all   PHQ-2 Score 0       Patient-reported           6/5/2025   Substance Use   Alcohol more than 3/day or more than 7/wk Not Applicable   Do you use any other substances recreationally? No     Social History     Tobacco Use    Smoking status: Never     Passive exposure: Never    Smokeless tobacco: Never   Vaping Use    Vaping status: Never Used   Substance Use Topics    Alcohol use: Never    Drug use: Never           3/28/2025   LAST FHS-7 RESULTS   1st degree relative breast or  ovarian cancer No   Any relative bilateral breast cancer No   Any male have breast cancer No   Any ONE woman have BOTH breast AND ovarian cancer No   Any woman with breast cancer before 50yrs No   2 or more relatives with breast AND/OR ovarian cancer No   2 or more relatives with breast AND/OR bowel cancer No        Mammogram Screening - Mammogram every 1-2 years updated in Health Maintenance based on mutual decision making        6/5/2025   STI Screening   New sexual partner(s) since last STI/HIV test? No     History of abnormal Pap smear: No - age 30- 66 PAP with HPV every 5 years recommended        Latest Ref Rng & Units 3/2/2023     3:08 PM   PAP / HPV   PAP  Negative for Intraepithelial Lesion or Malignancy (NILM)    HPV 16 DNA Negative Negative    HPV 18 DNA Negative Negative    Other HR HPV Negative Negative      ASCVD Risk   The 10-year ASCVD risk score (Luz Elena TELLO, et al., 2019) is: 3.7%    Values used to calculate the score:      Age: 60 years      Sex: Female      Is Non- : No      Diabetic: No      Tobacco smoker: No      Systolic Blood Pressure: 133 mmHg      Is BP treated: Yes      HDL Cholesterol: 63 mg/dL      Total Cholesterol: 214 mg/dL       Reviewed and updated as needed this visit by Provider                    Patient Active Problem List   Diagnosis    Essential hypertension    Factor 5 Leiden mutation, heterozygous    Hearing loss    Obstructive sleep apnea    Vitamin D deficiency    Class 2 severe obesity due to excess calories with serious comorbidity and body mass index (BMI) of 37.0 to 37.9 in adult (H)    Prediabetes     Past Surgical History:   Procedure Laterality Date    ABDOMEN SURGERY      4 surgeries for appendix, 2 hernias, bowel obstruction    APPENDECTOMY  12/23/2015    COLONOSCOPY      HERNIA REPAIR      VASCULAR SURGERY  May 2021       Social History     Tobacco Use    Smoking status: Never     Passive exposure: Never    Smokeless tobacco: Never  "  Substance Use Topics    Alcohol use: Never     Family History   Problem Relation Age of Onset    Colon Cancer Father          1993         Current Outpatient Medications   Medication Sig Dispense Refill    aspirin (ASA) 325 MG tablet Take 1 tablet by mouth once daily with a meal.      lisinopril (ZESTRIL) 20 MG tablet Take 1 tablet (20 mg) by mouth daily. 90 tablet 1    Multiple Vitamin (MULTIVITAMIN PO) Take 1 tab by oral route once daily with food.      semaglutide-weight management (WEGOVY) 0.25 MG/0.5ML pen Inject 0.5 mLs (0.25 mg) subcutaneously once a week. 2 mL 0     Allergies   Allergen Reactions    Dust Mites Unknown    Pollen Extract      Other reaction(s): Runny Nose    Seasonal Allergies      Other reaction(s): Runny Nose          Objective    Exam  /84 (BP Location: Left arm, Patient Position: Left side, Cuff Size: Adult Large)   Pulse 71   Temp 98  F (36.7  C) (Oral)   Resp 12   Ht 1.664 m (5' 5.5\")   Wt 107.3 kg (236 lb 9.6 oz)   LMP  (LMP Unknown)   SpO2 97%   BMI 38.77 kg/m     Estimated body mass index is 38.77 kg/m  as calculated from the following:    Height as of this encounter: 1.664 m (5' 5.5\").    Weight as of this encounter: 107.3 kg (236 lb 9.6 oz).    Physical Exam  GENERAL: alert and no distress  EYES: Eyes grossly normal to inspection, PERRL and conjunctivae and sclerae normal  HENT: ear canals and TM's normal, nose and mouth without ulcers or lesions  NECK: no adenopathy, no asymmetry, masses, or scars  RESP: lungs clear to auscultation - no rales, rhonchi or wheezes  CV: regular rate and rhythm, normal S1 S2, no S3 or S4, no murmur, click or rub, no peripheral edema  ABDOMEN: soft, nontender, no hepatosplenomegaly, no masses and bowel sounds normal  MS: no gross musculoskeletal defects noted, no edema  SKIN: no suspicious lesions or rashes  NEURO: Normal strength and tone, mentation intact and speech normal  PSYCH: mentation appears normal, affect " normal/bright    The longitudinal plan of care for the diagnosis(es)/condition(s) as documented were addressed during this visit. Due to the added complexity in care, I will continue to support Andreia in the subsequent management and with ongoing continuity of care.    Signed Electronically by: DENNYS MARTINEZ MD

## 2025-06-10 ENCOUNTER — RESULTS FOLLOW-UP (OUTPATIENT)
Dept: FAMILY MEDICINE | Facility: CLINIC | Age: 60
End: 2025-06-10

## 2025-06-10 LAB
CHOLEST SERPL-MCNC: 214 MG/DL
FASTING STATUS PATIENT QL REPORTED: NO
HDLC SERPL-MCNC: 63 MG/DL
LDLC SERPL CALC-MCNC: 122 MG/DL
NONHDLC SERPL-MCNC: 151 MG/DL
TRIGL SERPL-MCNC: 144 MG/DL

## 2025-06-10 RX ORDER — LISINOPRIL 20 MG/1
20 TABLET ORAL DAILY
Qty: 90 TABLET | Refills: 1 | Status: SHIPPED | OUTPATIENT
Start: 2025-06-10 | End: 2025-06-11

## 2025-06-10 NOTE — ASSESSMENT & PLAN NOTE
We had a long conversation today as it relates to approaching obesity is a chronic disease.  The patient has a pattern of being quite focused and diligent with logging her foods and making good choices.  However, she struggles to sustain this over time.  We discussed the black-and-white thinking and trying to work on incorporating more healthy foods as well as avoiding having the sweet treats at home at least.  Discussed the important role of exercise and weight management and encouraged her to get back into a good exercise routine.  We discussed medications that may be helpful and ultimately a prescription for Wegovy was provided.  Await prior authorization to see if insurance is going to cover that.  Orders:    semaglutide-weight management (WEGOVY) 0.25 MG/0.5ML pen; Inject 0.5 mLs (0.25 mg) subcutaneously once a week.

## 2025-06-10 NOTE — ASSESSMENT & PLAN NOTE
The patient's blood pressure appears to be under suboptimal control and I recommended increasing lisinopril to 20 mg daily.  Orders:    lisinopril (ZESTRIL) 20 MG tablet; Take 1 tablet (20 mg) by mouth daily.

## 2025-06-11 ENCOUNTER — TELEPHONE (OUTPATIENT)
Dept: FAMILY MEDICINE | Facility: CLINIC | Age: 60
End: 2025-06-11
Payer: COMMERCIAL

## 2025-06-11 DIAGNOSIS — I10 ESSENTIAL HYPERTENSION: ICD-10-CM

## 2025-06-11 DIAGNOSIS — E66.812 CLASS 2 SEVERE OBESITY WITH SERIOUS COMORBIDITY AND BODY MASS INDEX (BMI) OF 37.0 TO 37.9 IN ADULT, UNSPECIFIED OBESITY TYPE (H): ICD-10-CM

## 2025-06-11 DIAGNOSIS — E66.01 CLASS 2 SEVERE OBESITY WITH SERIOUS COMORBIDITY AND BODY MASS INDEX (BMI) OF 37.0 TO 37.9 IN ADULT, UNSPECIFIED OBESITY TYPE (H): ICD-10-CM

## 2025-06-11 RX ORDER — LISINOPRIL 20 MG/1
20 TABLET ORAL DAILY
Qty: 90 TABLET | Refills: 1 | Status: SHIPPED | OUTPATIENT
Start: 2025-06-11

## 2025-06-11 NOTE — TELEPHONE ENCOUNTER
Spoke with patient - Patient is requesting medications from 06/10 to be sent to [facility] instead. Preferred pharmacy prepped.      Shaheen Hammer, RN  P [facility] Pool  Transmission to pharmacy failed for prescribed meds. Please contact patient and confirm preferred alt pharmacy. Route back to [facility] [place] RN pool so med can be resent to preferred pharmacy.